# Patient Record
Sex: MALE | Race: WHITE | NOT HISPANIC OR LATINO | Employment: OTHER | URBAN - METROPOLITAN AREA
[De-identification: names, ages, dates, MRNs, and addresses within clinical notes are randomized per-mention and may not be internally consistent; named-entity substitution may affect disease eponyms.]

---

## 2018-08-10 ENCOUNTER — HISTORICAL (OUTPATIENT)
Dept: ADMINISTRATIVE | Facility: HOSPITAL | Age: 63
End: 2018-08-10

## 2018-08-10 LAB
ABS NEUT (OLG): 4.7
ALBUMIN SERPL-MCNC: 4.4 GM/DL (ref 3.4–5)
ALBUMIN/GLOB SERPL: 2 {RATIO} (ref 1.5–2.5)
ALP SERPL-CCNC: 35 UNIT/L (ref 38–126)
ALT SERPL-CCNC: 33 UNIT/L (ref 7–52)
APPEARANCE, UA: CLEAR
AST SERPL-CCNC: 24 UNIT/L (ref 15–37)
BACTERIA #/AREA URNS AUTO: ABNORMAL /HPF
BILIRUB SERPL-MCNC: 0.6 MG/DL (ref 0.2–1)
BILIRUB UR QL STRIP: NEGATIVE MG/DL
BILIRUBIN DIRECT+TOT PNL SERPL-MCNC: 0.1 MG/DL (ref 0–0.5)
BILIRUBIN DIRECT+TOT PNL SERPL-MCNC: 0.5 MG/DL
BUN SERPL-MCNC: 14 MG/DL (ref 7–18)
CALCIUM SERPL-MCNC: 9.2 MG/DL (ref 8.5–10)
CHLORIDE SERPL-SCNC: 106 MMOL/L (ref 98–107)
CHOLEST SERPL-MCNC: 176 MG/DL (ref 0–200)
CHOLEST/HDLC SERPL: 4 {RATIO}
CO2 SERPL-SCNC: 27 MMOL/L (ref 21–32)
COLOR UR: YELLOW
CREAT SERPL-MCNC: 0.9 MG/DL (ref 0.6–1.3)
ERYTHROCYTE [DISTWIDTH] IN BLOOD BY AUTOMATED COUNT: 13.1 % (ref 11.5–17)
EST. AVERAGE GLUCOSE BLD GHB EST-MCNC: 137 MG/DL
GLOBULIN SER-MCNC: 2.2 GM/DL (ref 1.2–3)
GLUCOSE (UA): NEGATIVE MG/DL
GLUCOSE SERPL-MCNC: 108 MG/DL (ref 74–106)
HBA1C MFR BLD: 6.4 % (ref 4.4–6.4)
HCT VFR BLD AUTO: 50.1 % (ref 42–52)
HDLC SERPL-MCNC: 44 MG/DL (ref 35–60)
HGB BLD-MCNC: 16.9 GM/DL (ref 14–18)
HGB UR QL STRIP: ABNORMAL UNIT/L
KETONES UR QL STRIP: NEGATIVE MG/DL
LDLC SERPL CALC-MCNC: 108 MG/DL (ref 0–129)
LEUKOCYTE ESTERASE UR QL STRIP: NEGATIVE UNIT/L
LYMPHOCYTES # BLD AUTO: 3 X10(3)/MCL (ref 0.6–3.4)
LYMPHOCYTES NFR BLD AUTO: 35 % (ref 13–40)
MCH RBC QN AUTO: 33.5 PG (ref 27–31.2)
MCHC RBC AUTO-ENTMCNC: 34 GM/DL (ref 32–36)
MCV RBC AUTO: 99 FL (ref 80–94)
MONOCYTES # BLD AUTO: 0.9 X10(3)/MCL (ref 0–1.8)
MONOCYTES NFR BLD AUTO: 10.3 % (ref 0.1–24)
NEUTROPHILS NFR BLD AUTO: 54.7 % (ref 47–80)
NITRITE UR QL STRIP.AUTO: NEGATIVE
PH UR STRIP: 6 [PH]
PLATELET # BLD AUTO: 301 X10(3)/MCL (ref 130–400)
PMV BLD AUTO: 10.2 FL
POTASSIUM SERPL-SCNC: 5.3 MMOL/L (ref 3.5–5.1)
PROT SERPL-MCNC: 6.6 GM/DL (ref 6.4–8.2)
PROT UR QL STRIP: NEGATIVE MG/DL
RBC # BLD AUTO: 5.04 X10(6)/MCL (ref 4.7–6.1)
RBC #/AREA URNS HPF: ABNORMAL /HPF
SODIUM SERPL-SCNC: 142 MMOL/L (ref 136–145)
SP GR UR STRIP: 1.02
SQUAMOUS EPITHELIAL, UA: ABNORMAL /LPF
TRIGL SERPL-MCNC: 124 MG/DL (ref 30–150)
TSH SERPL-ACNC: 1.75 MIU/ML (ref 0.35–4.94)
UROBILINOGEN UR STRIP-ACNC: 0.2 MG/DL
VLDLC SERPL CALC-MCNC: 24.8 MG/DL
WBC # SPEC AUTO: 8.6 X10(3)/MCL (ref 4.5–11.5)
WBC #/AREA URNS AUTO: ABNORMAL /[HPF]

## 2019-05-23 ENCOUNTER — HISTORICAL (OUTPATIENT)
Dept: ADMINISTRATIVE | Facility: HOSPITAL | Age: 64
End: 2019-05-23

## 2019-06-04 ENCOUNTER — HISTORICAL (OUTPATIENT)
Dept: ADMINISTRATIVE | Facility: HOSPITAL | Age: 64
End: 2019-06-04

## 2019-08-16 ENCOUNTER — HISTORICAL (OUTPATIENT)
Dept: ADMINISTRATIVE | Facility: HOSPITAL | Age: 64
End: 2019-08-16

## 2019-08-16 LAB
ABS NEUT (OLG): 4.3 X10(3)/MCL (ref 2.1–9.2)
ALBUMIN SERPL-MCNC: 4.3 GM/DL (ref 3.4–5)
ALBUMIN/GLOB SERPL: 1.87 {RATIO} (ref 1.5–2.5)
ALP SERPL-CCNC: 37 UNIT/L (ref 38–126)
ALT SERPL-CCNC: 33 UNIT/L (ref 7–52)
APPEARANCE, UA: CLEAR
AST SERPL-CCNC: 21 UNIT/L (ref 15–37)
BACTERIA #/AREA URNS AUTO: ABNORMAL /HPF
BILIRUB SERPL-MCNC: 0.5 MG/DL (ref 0.2–1)
BILIRUB UR QL STRIP: NEGATIVE MG/DL
BILIRUBIN DIRECT+TOT PNL SERPL-MCNC: 0.1 MG/DL (ref 0–0.5)
BILIRUBIN DIRECT+TOT PNL SERPL-MCNC: 0.4 MG/DL
BUN SERPL-MCNC: 16 MG/DL (ref 7–18)
CALCIUM SERPL-MCNC: 9.8 MG/DL (ref 8.5–10)
CHLORIDE SERPL-SCNC: 103 MMOL/L (ref 98–107)
CHOLEST SERPL-MCNC: 190 MG/DL (ref 0–200)
CHOLEST/HDLC SERPL: 3.4 {RATIO}
CO2 SERPL-SCNC: 33 MMOL/L (ref 21–32)
COLOR UR: YELLOW
CREAT SERPL-MCNC: 0.97 MG/DL (ref 0.6–1.3)
ERYTHROCYTE [DISTWIDTH] IN BLOOD BY AUTOMATED COUNT: 13.8 % (ref 11.5–17)
EST. AVERAGE GLUCOSE BLD GHB EST-MCNC: 128 MG/DL
GLOBULIN SER-MCNC: 2.3 GM/DL (ref 1.2–3)
GLUCOSE (UA): NEGATIVE MG/DL
GLUCOSE SERPL-MCNC: 119 MG/DL (ref 74–106)
HBA1C MFR BLD: 6.1 % (ref 4.4–6.4)
HCT VFR BLD AUTO: 50.7 % (ref 42–52)
HDLC SERPL-MCNC: 56 MG/DL (ref 35–60)
HGB BLD-MCNC: 17.3 GM/DL (ref 14–18)
HGB UR QL STRIP: ABNORMAL UNIT/L
KETONES UR QL STRIP: NEGATIVE MG/DL
LDLC SERPL CALC-MCNC: 110 MG/DL (ref 0–129)
LEUKOCYTE ESTERASE UR QL STRIP: NEGATIVE UNIT/L
LYMPHOCYTES # BLD AUTO: 2.4 X10(3)/MCL (ref 0.6–3.4)
LYMPHOCYTES NFR BLD AUTO: 30.4 % (ref 13–40)
MCH RBC QN AUTO: 33.3 PG (ref 27–31.2)
MCHC RBC AUTO-ENTMCNC: 34 GM/DL (ref 32–36)
MCV RBC AUTO: 98 FL (ref 80–94)
MONOCYTES # BLD AUTO: 1.1 X10(3)/MCL (ref 0.1–1.3)
MONOCYTES NFR BLD AUTO: 13.7 % (ref 0.1–24)
NEUTROPHILS NFR BLD AUTO: 55.9 % (ref 47–80)
NITRITE UR QL STRIP.AUTO: NEGATIVE
PH UR STRIP: 5 [PH]
PLATELET # BLD AUTO: 303 X10(3)/MCL (ref 130–400)
PMV BLD AUTO: 9.2 FL (ref 9.4–12.4)
POTASSIUM SERPL-SCNC: 5.2 MMOL/L (ref 3.5–5.1)
PROT SERPL-MCNC: 6.6 GM/DL (ref 6.4–8.2)
PROT UR QL STRIP: NEGATIVE MG/DL
PSA SERPL-MCNC: 0.48 NG/ML (ref 0–4.5)
RBC # BLD AUTO: 5.2 X10(6)/MCL (ref 4.7–6.1)
RBC #/AREA URNS HPF: ABNORMAL /HPF
SODIUM SERPL-SCNC: 140 MMOL/L (ref 136–145)
SP GR UR STRIP: 1.02
SQUAMOUS EPITHELIAL, UA: ABNORMAL /LPF
TRIGL SERPL-MCNC: 88 MG/DL (ref 30–150)
UROBILINOGEN UR STRIP-ACNC: 0.2 MG/DL
VLDLC SERPL CALC-MCNC: 17.6 MG/DL
WBC # SPEC AUTO: 7.8 X10(3)/MCL (ref 4.5–11.5)
WBC #/AREA URNS AUTO: ABNORMAL /[HPF]

## 2020-08-20 ENCOUNTER — HISTORICAL (OUTPATIENT)
Dept: ADMINISTRATIVE | Facility: HOSPITAL | Age: 65
End: 2020-08-20

## 2020-08-20 LAB
ABS NEUT (OLG): 4.9 X10(3)/MCL (ref 2.1–9.2)
ALBUMIN SERPL-MCNC: 4.5 GM/DL (ref 3.4–5)
ALBUMIN/GLOB SERPL: 2.05 {RATIO} (ref 1.5–2.5)
ALP SERPL-CCNC: 47 UNIT/L (ref 38–126)
ALT SERPL-CCNC: 27 UNIT/L (ref 7–52)
APPEARANCE, UA: CLEAR
AST SERPL-CCNC: 18 UNIT/L (ref 15–37)
BACTERIA #/AREA URNS AUTO: ABNORMAL /HPF
BILIRUB SERPL-MCNC: 0.6 MG/DL (ref 0.2–1)
BILIRUB UR QL STRIP: NEGATIVE MG/DL
BILIRUBIN DIRECT+TOT PNL SERPL-MCNC: 0.1 MG/DL (ref 0–0.5)
BILIRUBIN DIRECT+TOT PNL SERPL-MCNC: 0.5 MG/DL
BUN SERPL-MCNC: 14 MG/DL (ref 7–18)
CALCIUM SERPL-MCNC: 10.1 MG/DL (ref 8.5–10.1)
CHLORIDE SERPL-SCNC: 103 MMOL/L (ref 98–107)
CHOLEST SERPL-MCNC: 166 MG/DL (ref 0–200)
CHOLEST/HDLC SERPL: 3.7 {RATIO}
CO2 SERPL-SCNC: 29 MMOL/L (ref 21–32)
COLOR UR: YELLOW
CREAT SERPL-MCNC: 1.02 MG/DL (ref 0.6–1.3)
DEPRECATED CALCIDIOL+CALCIFEROL SERPL-MC: 23.3 NG/ML (ref 30–80)
ERYTHROCYTE [DISTWIDTH] IN BLOOD BY AUTOMATED COUNT: 13.6 % (ref 11.5–17)
EST. AVERAGE GLUCOSE BLD GHB EST-MCNC: 126 MG/DL
GLOBULIN SER-MCNC: 2.2 GM/DL (ref 1.2–3)
GLUCOSE (UA): NEGATIVE MG/DL
GLUCOSE SERPL-MCNC: 105 MG/DL (ref 74–106)
HBA1C MFR BLD: 6 % (ref 4.4–6.4)
HCT VFR BLD AUTO: 49.9 % (ref 42–52)
HDLC SERPL-MCNC: 45 MG/DL (ref 35–60)
HGB BLD-MCNC: 16.5 GM/DL (ref 14–18)
HGB UR QL STRIP: ABNORMAL UNIT/L
KETONES UR QL STRIP: ABNORMAL MG/DL
LDLC SERPL CALC-MCNC: 109 MG/DL (ref 0–129)
LEUKOCYTE ESTERASE UR QL STRIP: NEGATIVE UNIT/L
LYMPHOCYTES # BLD AUTO: 3.2 X10(3)/MCL (ref 0.6–3.4)
LYMPHOCYTES NFR BLD AUTO: 34.6 % (ref 13–40)
MCH RBC QN AUTO: 32.2 PG (ref 27–31.2)
MCHC RBC AUTO-ENTMCNC: 33 GM/DL (ref 32–36)
MCV RBC AUTO: 98 FL (ref 80–94)
MONOCYTES # BLD AUTO: 1.1 X10(3)/MCL (ref 0.1–1.3)
MONOCYTES NFR BLD AUTO: 11.6 % (ref 0.1–24)
NEUTROPHILS NFR BLD AUTO: 53.8 % (ref 47–80)
NITRITE UR QL STRIP.AUTO: NEGATIVE
PH UR STRIP: 5.5 [PH]
PLATELET # BLD AUTO: 324 X10(3)/MCL (ref 130–400)
PMV BLD AUTO: 9.8 FL (ref 9.4–12.4)
POTASSIUM SERPL-SCNC: 4.8 MMOL/L (ref 3.5–5.1)
PROT SERPL-MCNC: 6.7 GM/DL (ref 6.4–8.2)
PROT UR QL STRIP: NEGATIVE MG/DL
PSA SERPL-MCNC: 0.5 NG/ML (ref 0–4.5)
RBC # BLD AUTO: 5.12 X10(6)/MCL (ref 4.7–6.1)
RBC #/AREA URNS HPF: ABNORMAL /HPF
SODIUM SERPL-SCNC: 140 MMOL/L (ref 136–145)
SP GR UR STRIP: 1.02
SQUAMOUS EPITHELIAL, UA: ABNORMAL /LPF
TRIGL SERPL-MCNC: 185 MG/DL (ref 30–150)
UROBILINOGEN UR STRIP-ACNC: 0.2 MG/DL
VLDLC SERPL CALC-MCNC: 37 MG/DL
WBC # SPEC AUTO: 9.2 X10(3)/MCL (ref 4.5–11.5)
WBC #/AREA URNS AUTO: ABNORMAL /[HPF]

## 2021-02-25 ENCOUNTER — HISTORICAL (OUTPATIENT)
Dept: ADMINISTRATIVE | Facility: HOSPITAL | Age: 66
End: 2021-02-25

## 2021-02-25 LAB
DEPRECATED CALCIDIOL+CALCIFEROL SERPL-MC: 39.4 NG/ML (ref 30–80)
EST. AVERAGE GLUCOSE BLD GHB EST-MCNC: 134 MG/DL
HBA1C MFR BLD: 6.3 % (ref 4.4–6.4)

## 2021-08-25 ENCOUNTER — HISTORICAL (OUTPATIENT)
Dept: ADMINISTRATIVE | Facility: HOSPITAL | Age: 66
End: 2021-08-25

## 2021-08-25 LAB
ABS NEUT (OLG): 4.2 X10(3)/MCL (ref 2.1–9.2)
ALBUMIN SERPL-MCNC: 4.4 GM/DL (ref 3.4–5)
ALBUMIN/GLOB SERPL: 1.83 {RATIO} (ref 1.5–2.5)
ALP SERPL-CCNC: 45 UNIT/L (ref 38–126)
ALT SERPL-CCNC: 47 UNIT/L (ref 7–52)
APPEARANCE, UA: ABNORMAL
AST SERPL-CCNC: 31 UNIT/L (ref 15–37)
BACTERIA #/AREA URNS AUTO: ABNORMAL /HPF
BILIRUB SERPL-MCNC: 0.6 MG/DL (ref 0.2–1)
BILIRUB UR QL STRIP: NEGATIVE MG/DL
BILIRUBIN DIRECT+TOT PNL SERPL-MCNC: 0.2 MG/DL (ref 0–0.5)
BILIRUBIN DIRECT+TOT PNL SERPL-MCNC: 0.4 MG/DL
BUN SERPL-MCNC: 14 MG/DL (ref 7–18)
CALCIUM SERPL-MCNC: 10.2 MG/DL (ref 8.5–10.1)
CHLORIDE SERPL-SCNC: 101 MMOL/L (ref 98–107)
CHOLEST SERPL-MCNC: 188 MG/DL (ref 0–200)
CHOLEST/HDLC SERPL: 4.1 {RATIO}
CO2 SERPL-SCNC: 30 MMOL/L (ref 21–32)
COLOR UR: ABNORMAL
CREAT SERPL-MCNC: 1.06 MG/DL (ref 0.6–1.3)
DEPRECATED CALCIDIOL+CALCIFEROL SERPL-MC: 36.7 NG/ML (ref 30–80)
ERYTHROCYTE [DISTWIDTH] IN BLOOD BY AUTOMATED COUNT: 13.2 % (ref 11.5–17)
EST. AVERAGE GLUCOSE BLD GHB EST-MCNC: 134 MG/DL
GLOBULIN SER-MCNC: 2.4 GM/DL (ref 1.2–3)
GLUCOSE (UA): NEGATIVE MG/DL
GLUCOSE SERPL-MCNC: 137 MG/DL (ref 74–106)
HBA1C MFR BLD: 6.3 % (ref 4.4–6.4)
HCT VFR BLD AUTO: 50.2 % (ref 42–52)
HDLC SERPL-MCNC: 46 MG/DL (ref 35–60)
HGB BLD-MCNC: 16.8 GM/DL (ref 14–18)
HGB UR QL STRIP: ABNORMAL UNIT/L
KETONES UR QL STRIP: NEGATIVE MG/DL
LDLC SERPL CALC-MCNC: 117 MG/DL (ref 0–129)
LEUKOCYTE ESTERASE UR QL STRIP: ABNORMAL UNIT/L
LYMPHOCYTES # BLD AUTO: 2.8 X10(3)/MCL (ref 0.6–3.4)
LYMPHOCYTES NFR BLD AUTO: 35.4 % (ref 13–40)
MCH RBC QN AUTO: 32.3 PG (ref 27–31.2)
MCHC RBC AUTO-ENTMCNC: 34 GM/DL (ref 32–36)
MCV RBC AUTO: 96 FL (ref 80–94)
MONOCYTES # BLD AUTO: 0.8 X10(3)/MCL (ref 0.1–1.3)
MONOCYTES NFR BLD AUTO: 10.6 % (ref 0.1–24)
NEUTROPHILS NFR BLD AUTO: 54 % (ref 47–80)
NITRITE UR QL STRIP.AUTO: NEGATIVE
PH UR STRIP: 6 [PH]
PLATELET # BLD AUTO: 326 X10(3)/MCL (ref 130–400)
PMV BLD AUTO: 9.7 FL (ref 9.4–12.4)
POTASSIUM SERPL-SCNC: 5.1 MMOL/L (ref 3.5–5.1)
PROT SERPL-MCNC: 6.8 GM/DL (ref 6.4–8.2)
PROT UR QL STRIP: NEGATIVE MG/DL
PSA SERPL-MCNC: 0.49 NG/ML (ref 0–4.5)
RBC # BLD AUTO: 5.2 X10(6)/MCL (ref 4.7–6.1)
RBC #/AREA URNS HPF: ABNORMAL /HPF
SODIUM SERPL-SCNC: 140 MMOL/L (ref 136–145)
SP GR UR STRIP: 1.02
SQUAMOUS EPITHELIAL, UA: ABNORMAL /LPF
TRIGL SERPL-MCNC: 164 MG/DL (ref 30–150)
TSH SERPL-ACNC: 2.31 MIU/ML (ref 0.35–4.94)
UROBILINOGEN UR STRIP-ACNC: 1 MG/DL
VLDLC SERPL CALC-MCNC: 32.8 MG/DL
WBC # SPEC AUTO: 7.8 X10(3)/MCL (ref 4.5–11.5)
WBC #/AREA URNS AUTO: ABNORMAL /[HPF]

## 2021-09-15 ENCOUNTER — HISTORICAL (OUTPATIENT)
Dept: ADMINISTRATIVE | Facility: HOSPITAL | Age: 66
End: 2021-09-15

## 2021-09-15 LAB
APPEARANCE, UA: CLEAR
BACTERIA #/AREA URNS AUTO: ABNORMAL /HPF
BILIRUB UR QL STRIP: NEGATIVE MG/DL
COLOR UR: YELLOW
GLUCOSE (UA): NEGATIVE MG/DL
HGB UR QL STRIP: ABNORMAL UNIT/L
KETONES UR QL STRIP: NEGATIVE MG/DL
LEUKOCYTE ESTERASE UR QL STRIP: NEGATIVE UNIT/L
NITRITE UR QL STRIP.AUTO: NEGATIVE
PH UR STRIP: 6 [PH]
PROT UR QL STRIP: NEGATIVE MG/DL
RBC #/AREA URNS HPF: ABNORMAL /HPF
SP GR UR STRIP: >1.03
SQUAMOUS EPITHELIAL, UA: ABNORMAL /LPF
UROBILINOGEN UR STRIP-ACNC: 0.2 MG/DL
WBC #/AREA URNS AUTO: ABNORMAL /[HPF]

## 2022-04-11 ENCOUNTER — HISTORICAL (OUTPATIENT)
Dept: ADMINISTRATIVE | Facility: HOSPITAL | Age: 67
End: 2022-04-11

## 2022-04-29 VITALS
WEIGHT: 243.38 LBS | DIASTOLIC BLOOD PRESSURE: 70 MMHG | SYSTOLIC BLOOD PRESSURE: 130 MMHG | BODY MASS INDEX: 34.84 KG/M2 | HEIGHT: 70 IN | OXYGEN SATURATION: 95 %

## 2022-08-25 PROBLEM — R73.9 HYPERGLYCEMIA: Status: ACTIVE | Noted: 2022-08-25

## 2022-08-25 PROBLEM — I10 ESSENTIAL (PRIMARY) HYPERTENSION: Status: ACTIVE | Noted: 2022-08-25

## 2022-08-25 PROBLEM — E55.9 VITAMIN D DEFICIENCY: Status: ACTIVE | Noted: 2022-08-25

## 2022-08-25 PROBLEM — Z00.00 WELLNESS EXAMINATION: Status: ACTIVE | Noted: 2022-08-25

## 2022-08-25 PROBLEM — G47.33 OBSTRUCTIVE SLEEP APNEA ON CPAP: Status: ACTIVE | Noted: 2022-08-25

## 2022-08-25 PROBLEM — G62.9 NEUROPATHY: Status: ACTIVE | Noted: 2022-08-25

## 2022-08-25 PROBLEM — M54.50 LOW BACK PAIN: Status: ACTIVE | Noted: 2022-08-25

## 2022-08-25 PROBLEM — E78.00 HYPERCHOLESTEREMIA: Status: ACTIVE | Noted: 2022-08-25

## 2022-09-27 PROBLEM — E11.9 TYPE 2 DIABETES MELLITUS WITHOUT COMPLICATION, WITHOUT LONG-TERM CURRENT USE OF INSULIN: Status: ACTIVE | Noted: 2022-09-27

## 2022-10-18 PROBLEM — R10.9 FLANK PAIN: Status: ACTIVE | Noted: 2022-10-18

## 2022-10-18 PROCEDURE — 87088 URINE BACTERIA CULTURE: CPT | Performed by: FAMILY MEDICINE

## 2022-11-28 PROBLEM — Z00.00 WELLNESS EXAMINATION: Status: RESOLVED | Noted: 2022-08-25 | Resolved: 2022-11-28

## 2022-11-28 PROBLEM — Z23 IMMUNIZATION DUE: Status: ACTIVE | Noted: 2022-11-28

## 2023-01-17 ENCOUNTER — OFFICE VISIT (OUTPATIENT)
Dept: NEUROLOGY | Facility: CLINIC | Age: 68
End: 2023-01-17
Payer: MEDICARE

## 2023-01-17 VITALS
HEIGHT: 69 IN | WEIGHT: 213 LBS | SYSTOLIC BLOOD PRESSURE: 160 MMHG | DIASTOLIC BLOOD PRESSURE: 92 MMHG | BODY MASS INDEX: 31.55 KG/M2

## 2023-01-17 DIAGNOSIS — G47.33 OBSTRUCTIVE SLEEP APNEA SYNDROME: Primary | ICD-10-CM

## 2023-01-17 PROCEDURE — 99213 OFFICE O/P EST LOW 20 MIN: CPT | Mod: PBBFAC | Performed by: SPECIALIST

## 2023-01-17 PROCEDURE — 99203 OFFICE O/P NEW LOW 30 MIN: CPT | Mod: S$PBB,,, | Performed by: SPECIALIST

## 2023-01-17 PROCEDURE — 99203 PR OFFICE/OUTPT VISIT, NEW, LEVL III, 30-44 MIN: ICD-10-PCS | Mod: S$PBB,,, | Performed by: SPECIALIST

## 2023-01-17 PROCEDURE — 99999 PR PBB SHADOW E&M-EST. PATIENT-LVL III: CPT | Mod: PBBFAC,,, | Performed by: SPECIALIST

## 2023-01-17 PROCEDURE — 99999 PR PBB SHADOW E&M-EST. PATIENT-LVL III: ICD-10-PCS | Mod: PBBFAC,,, | Performed by: SPECIALIST

## 2023-01-17 RX ORDER — MELOXICAM 7.5 MG/1
7.5 TABLET ORAL
COMMUNITY
Start: 2023-01-02 | End: 2023-09-08 | Stop reason: SDUPTHER

## 2023-01-17 NOTE — PROGRESS NOTES
Subjective:       Patient ID: Micah Mendoza is a 67 y.o. male.    Chief Complaint: snoring; sleep apnea evaluation; other____    HPI:            Previos pt Last OV 2016-GUY.    (SS sone her in 2016 Dx w GUY.   Has not been on CPAP for 1 yr due to recall.   Falls asleep in 15-30 min.   Sleeps 5-9 and awakens 1-3 due to nocturia and is able to go back to sleep in abt 10 min.   Awakens unrefreshed and has EDS.   Does not nap.   TV in room is off. )    notes may also be on facesheet for HPI, ROS, and other sections     Review of Systems  Hard copy ROS reviewed     EPWORTH SLEEPINESS SCALE 1/17/2023   Sitting and reading 2   Watching TV 1   Sitting, inactive in a public place (e.g. a theatre or a meeting) 0   As a passenger in a car for an hour without a break 0   Lying down to rest in the afternoon when circumstances permit 1   Sitting and talking to someone 0   Sitting quietly after a lunch without alcohol 0   In a car, while stopped for a few minutes in traffic 0   Total score 4           Social History     Socioeconomic History    Marital status:     Number of children: 1   Occupational History    Occupation: yard work   Tobacco Use    Smoking status: Never    Smokeless tobacco: Never   Substance and Sexual Activity    Alcohol use: Not Currently    Drug use: Not Currently     Types: Marijuana     ----------------------------  Environmental allergies  Essential (primary) hypertension  Fatigue  Hypercholesteremia  Low back pain  Neck pain  Neuropathy  Obstructive sleep apnea on CPAP  Unspecified osteoarthritis, unspecified site  Vitamin D deficiency      Current Outpatient Medications:     amLODIPine (NORVASC) 10 MG tablet, Take 1 tablet (10 mg total) by mouth once daily., Disp: 90 tablet, Rfl: 3    blood sugar diagnostic Strp, To check BG 2  times daily, to use with insurance preferred meter, Disp: 100 each, Rfl: 11    blood-glucose meter kit, To check BG 2  times daily, to use with insurance preferred meter,  "Disp: 1 each, Rfl: 0    cholecalciferol, vitamin D3, (VITAMIN D3) 25 mcg (1,000 unit) capsule, Take 25 mcg by mouth once daily., Disp: , Rfl:     citalopram (CELEXA) 40 MG tablet, Take 1 tablet (40 mg total) by mouth once daily., Disp: 90 tablet, Rfl: 3    lancets Misc, To check BG 2  times daily, to use with insurance preferred meter, Disp: 100 each, Rfl: 11    meloxicam (MOBIC) 7.5 MG tablet, Take 7.5 mg by mouth., Disp: , Rfl:     metFORMIN (GLUCOPHAGE) 1000 MG tablet, Take 1 tablet (1,000 mg total) by mouth 2 (two) times daily with meals., Disp: 180 tablet, Rfl: 1    nitroGLYCERIN (NITROSTAT) 0.4 MG SL tablet, Place 0.4 mg under the tongue every 5 (five) minutes as needed for Chest pain., Disp: , Rfl:     ONETOUCH DELICA PLUS LANCET 33 gauge Misc, USE TO TEST BLOOD SUGAR 4 TIMES DAILY, Disp: , Rfl:     ONETOUCH ULTRA2 METER Misc, USE TO TEST BLOOD SUGAR 4 TIMES DAILY, Disp: , Rfl:     rosuvastatin (CRESTOR) 20 MG tablet, Take 1 tablet (20 mg total) by mouth every evening., Disp: 90 tablet, Rfl: 3     Objective:      Exam:   BP (!) 160/92   Ht 5' 9" (1.753 m)   Wt 96.6 kg (213 lb)   BMI 31.45 kg/m²   Neck Circumference: 16.5 in   General Exam  if accompanied, by__  mental status_alert and appropriate  Oropharynx Mallampati grade_ 3  body habitus_ Body mass index is 31.45 kg/m².   Heart_ RRR  Extremities_ no edema   skin_  Neurological  Speech __ normal   cranial nerves:  CN 2 VF_  CN 7_no lower face asymmetry  CN 12 tongue_ok  Motor__ PATTEN's   Gait: unassisted    Labs:  __  Lengthy discussion about the risks of untreated moderate to severe obstructive sleep apnea (GUY).   Testing modalities/test options for sleep apnea discussed.  Potential need for treatment of GUY discussed including CPAP or Bilevel  PAP.   Alternatives to PAP discussed if PAP not ultimately tolerated.  Risks of drowsy driving discussed.  Weight loss discussed if patient is overweight.          Assessment/Plan:       Problem List Items " Addressed This Visit          Other    Obstructive sleep apnea syndrome - Primary               Orders Placed This Encounter   Procedures    Home Sleep Study       _._hst ordered, and if PAP needed pt unwilling to come into the lab for PAP night autopap can be ordered       Tyshawn Vazquez MD

## 2023-01-19 ENCOUNTER — PROCEDURE VISIT (OUTPATIENT)
Dept: SLEEP MEDICINE | Facility: HOSPITAL | Age: 68
End: 2023-01-19
Attending: SPECIALIST
Payer: MEDICARE

## 2023-01-19 DIAGNOSIS — G47.33 OBSTRUCTIVE SLEEP APNEA SYNDROME: Primary | ICD-10-CM

## 2023-01-19 PROCEDURE — 95806 SLEEP STUDY UNATT&RESP EFFT: CPT | Mod: 26,,, | Performed by: SPECIALIST

## 2023-01-19 PROCEDURE — 95806 SLEEP STUDY UNATT&RESP EFFT: CPT

## 2023-01-19 PROCEDURE — 95806 PR SLEEP STUDY, UNATTENDED, SIMUL RECORD HR/O2 SAT/RESP FLOW/RESP EFFT: ICD-10-PCS | Mod: 26,,, | Performed by: SPECIALIST

## 2023-02-14 ENCOUNTER — OFFICE VISIT (OUTPATIENT)
Dept: NEUROLOGY | Facility: CLINIC | Age: 68
End: 2023-02-14
Payer: MEDICARE

## 2023-02-14 VITALS
HEIGHT: 69 IN | WEIGHT: 200 LBS | SYSTOLIC BLOOD PRESSURE: 116 MMHG | DIASTOLIC BLOOD PRESSURE: 72 MMHG | BODY MASS INDEX: 29.62 KG/M2

## 2023-02-14 DIAGNOSIS — G47.33 OBSTRUCTIVE SLEEP APNEA: Primary | ICD-10-CM

## 2023-02-14 PROCEDURE — 99213 OFFICE O/P EST LOW 20 MIN: CPT | Mod: S$PBB,,, | Performed by: NURSE PRACTITIONER

## 2023-02-14 PROCEDURE — 99999 PR PBB SHADOW E&M-EST. PATIENT-LVL III: ICD-10-PCS | Mod: PBBFAC,,, | Performed by: NURSE PRACTITIONER

## 2023-02-14 PROCEDURE — 99999 PR PBB SHADOW E&M-EST. PATIENT-LVL III: CPT | Mod: PBBFAC,,, | Performed by: NURSE PRACTITIONER

## 2023-02-14 PROCEDURE — 99213 OFFICE O/P EST LOW 20 MIN: CPT | Mod: PBBFAC | Performed by: NURSE PRACTITIONER

## 2023-02-14 PROCEDURE — 99213 PR OFFICE/OUTPT VISIT, EST, LEVL III, 20-29 MIN: ICD-10-PCS | Mod: S$PBB,,, | Performed by: NURSE PRACTITIONER

## 2023-02-14 NOTE — PROGRESS NOTES
Established GUY Patient   SUBJECTIVE:    Patient ID: Micah Mendoza , 67 y.o.    Past Medical History:   Diagnosis Date    Environmental allergies     Essential (primary) hypertension     Fatigue     Hypercholesteremia     Low back pain     Neck pain     Neuropathy     Obstructive sleep apnea on CPAP     Unspecified osteoarthritis, unspecified site     Vitamin D deficiency        Past Surgical History:   Procedure Laterality Date    ANTERIOR CRUCIATE LIGAMENT REPAIR      Cervical vertebral fusion      CLOSED REDUCTION OF FRACTURE OF NASAL BONE      Closed [percutaneous] [needle] biopsy of kidney  02/15/2012    COLONOSCOPY  10/17/2011    Excision of accessory spleen  02/15/2012    EXPLORATORY LAPAROTOMY      LAPAROSCOPY  02/15/2012    NASAL SEPTOPLASTY      orchidectomy      Right foot      ROTATOR CUFF REPAIR      Unilateral adrenalectomy   02/15/2012       Family History   Problem Relation Age of Onset    Dementia Mother     Diabetes Mother     Blindness Father     Deafness Father     Stroke Father     Alcohol abuse Brother        Social History     Socioeconomic History    Marital status:     Number of children: 1   Occupational History    Occupation: yard work   Tobacco Use    Smoking status: Never    Smokeless tobacco: Never   Substance and Sexual Activity    Alcohol use: Not Currently    Drug use: Not Currently     Types: Marijuana       Review of patient's allergies indicates:   Allergen Reactions    Penicillin Swelling       Chief Complaint:  HST results    History of Present Illness:     Review HST results; no changes since last visit.     Review of Systems - as per HPI, otherwise a balanced 10 systems review is negative.      Current Medications:    Current Outpatient Medications:     amLODIPine (NORVASC) 10 MG tablet, Take 1 tablet (10 mg total) by mouth once daily., Disp: 90 tablet, Rfl: 3    blood sugar diagnostic Strp, To check BG 2  times daily, to use with insurance preferred meter, Disp:  "100 each, Rfl: 11    blood-glucose meter kit, To check BG 2  times daily, to use with insurance preferred meter, Disp: 1 each, Rfl: 0    cholecalciferol, vitamin D3, (VITAMIN D3) 25 mcg (1,000 unit) capsule, Take 25 mcg by mouth once daily., Disp: , Rfl:     citalopram (CELEXA) 40 MG tablet, Take 1 tablet (40 mg total) by mouth once daily., Disp: 90 tablet, Rfl: 3    lancets Misc, To check BG 2  times daily, to use with insurance preferred meter, Disp: 100 each, Rfl: 11    meloxicam (MOBIC) 7.5 MG tablet, Take 7.5 mg by mouth., Disp: , Rfl:     metFORMIN (GLUCOPHAGE) 1000 MG tablet, Take 1 tablet (1,000 mg total) by mouth 2 (two) times daily with meals., Disp: 180 tablet, Rfl: 1    nitroGLYCERIN (NITROSTAT) 0.4 MG SL tablet, Place 0.4 mg under the tongue every 5 (five) minutes as needed for Chest pain., Disp: , Rfl:     ONETOUCH DELICA PLUS LANCET 33 gauge Misc, USE TO TEST BLOOD SUGAR 4 TIMES DAILY, Disp: , Rfl:     ONETOUCH ULTRA2 METER Misc, USE TO TEST BLOOD SUGAR 4 TIMES DAILY, Disp: , Rfl:     rosuvastatin (CRESTOR) 20 MG tablet, Take 1 tablet (20 mg total) by mouth every evening., Disp: 90 tablet, Rfl: 3      OBJECTIVE:    Vitals:  /72 (BP Location: Left arm, Patient Position: Sitting)   Ht 5' 9" (1.753 m)   Wt 90.7 kg (200 lb)   BMI 29.53 kg/m²      Physical Exam:  Constitutional  he appears well-developed and well-nourished. he is well groomed.    Accompanied by - self  Appearance - well appearing, no apparent distress, unassisted  Heart - RRR auscultated without murmur  Lungs - CTA   Skin- no obvious lesions noted    Neurologic  Cortical function - The patient is alert, attentive, and oriented  Speech - clear   Cranial nerves:  CN 3, 4, 6 EOMs - normal. No ptosis or lateral gaze deviation  CN 7 - no face asymmetry; normal eye closure and smile  CN 8 - hearing is grossly normal  Motor - grossly normal  Gait - unassisted; posture upright. gait is steady with normal steps    Review of Data:    "       EPWORTH SLEEPINESS SCALE 1/17/2023   Sitting and reading 2   Watching TV 1   Sitting, inactive in a public place (e.g. a theatre or a meeting) 0   As a passenger in a car for an hour without a break 0   Lying down to rest in the afternoon when circumstances permit 1   Sitting and talking to someone 0   Sitting quietly after a lunch without alcohol 0   In a car, while stopped for a few minutes in traffic 0   Total score 4       Labs:  Office Visit on 11/28/2022   Component Date Value Ref Range Status    Urine Creatinine 11/28/2022 300.0  mg/dL Final    Microalbumin Creatinine Ratio 11/28/2022 10.0  mg/gm Cr Final    Urine Microalbumin 11/28/2022 30.0  mg/L Final    Hemoglobin A1c 11/28/2022 6.1  % Final    Estimated Average Glucose 11/28/2022 128.4  mg/dL Final          ASSESSMENT /PLAN:    Problem List Items Addressed This Visit          Other    Obstructive sleep apnea - Primary    HST results:   HST DELFINA: 26   Minimum SaO2: 81 %     Reviewed HST report with pt (see detailed results and interpretation scanned into chart). Suggest autoPAP with pressures from 5-20 cm.    Encouraged nightly use of PAP. Discussed minimum insurance guidelines for PAP use    Reminded pt that drowsy driving may still occur despite PAP use.    Follow up requested in 3 mo. Instructed pt to call prior if needed            Questions and concerns were sought and answered to the patient's stated verbal satisfaction.    The patient verbalizes understanding and agreement with the above stated treatment plan.   Dr. Vazquez was available during today's encounter.     Items discussed include acute and/or chronic neurological, sleep, or other issues and their attendant differential diagnoses.  Potential for additional testing, treatment options, and prognosis also discussed.    __*_single dx ___multiple issues/ diagnoses  ___ low __* mod ___ high complexity of data  __*_low __mod ___ high risks     Medical Decision Making (MDM) used for CPT  choice:  _*__low  ___moderate  ____high        FERNANDA Roca  [facility]  [phone]

## 2023-02-25 PROBLEM — M54.42 CHRONIC MIDLINE LOW BACK PAIN WITH LEFT-SIDED SCIATICA: Status: ACTIVE | Noted: 2022-08-25

## 2023-02-25 PROBLEM — G89.29 CHRONIC MIDLINE LOW BACK PAIN WITH LEFT-SIDED SCIATICA: Status: ACTIVE | Noted: 2022-08-25

## 2023-02-25 PROBLEM — I65.23 BILATERAL CAROTID ARTERY STENOSIS: Status: ACTIVE | Noted: 2023-02-25

## 2023-04-24 ENCOUNTER — OFFICE VISIT (OUTPATIENT)
Dept: NEUROLOGY | Facility: CLINIC | Age: 68
End: 2023-04-24
Payer: MEDICARE

## 2023-04-24 VITALS
DIASTOLIC BLOOD PRESSURE: 60 MMHG | SYSTOLIC BLOOD PRESSURE: 110 MMHG | WEIGHT: 200 LBS | HEIGHT: 69 IN | BODY MASS INDEX: 29.62 KG/M2

## 2023-04-24 DIAGNOSIS — G47.33 OBSTRUCTIVE SLEEP APNEA: Primary | ICD-10-CM

## 2023-04-24 PROCEDURE — 99213 OFFICE O/P EST LOW 20 MIN: CPT | Mod: PBBFAC | Performed by: NURSE PRACTITIONER

## 2023-04-24 PROCEDURE — 99213 OFFICE O/P EST LOW 20 MIN: CPT | Mod: S$PBB,,, | Performed by: NURSE PRACTITIONER

## 2023-04-24 PROCEDURE — 99999 PR PBB SHADOW E&M-EST. PATIENT-LVL III: ICD-10-PCS | Mod: PBBFAC,,, | Performed by: NURSE PRACTITIONER

## 2023-04-24 PROCEDURE — 99999 PR PBB SHADOW E&M-EST. PATIENT-LVL III: CPT | Mod: PBBFAC,,, | Performed by: NURSE PRACTITIONER

## 2023-04-24 PROCEDURE — 99213 PR OFFICE/OUTPT VISIT, EST, LEVL III, 20-29 MIN: ICD-10-PCS | Mod: S$PBB,,, | Performed by: NURSE PRACTITIONER

## 2023-04-24 RX ORDER — PYRIDOXINE HCL (VITAMIN B6) 100 MG
50 TABLET ORAL DAILY
COMMUNITY

## 2023-04-24 NOTE — PROGRESS NOTES
Usage dates: 03/17/23-04/19/23  Usage days >= 4 hours: 100  Current pressure: apap 5/96wqG1K  AHI: 3.4

## 2023-04-24 NOTE — PROGRESS NOTES
Established GUY Patient   SUBJECTIVE:    Patient ID: Micah Mendoza , 67 y.o.    Past Medical History:   Diagnosis Date    Environmental allergies     Essential (primary) hypertension     Fatigue     Hypercholesteremia     Neck pain     Neuropathy     Obstructive sleep apnea on CPAP     Unspecified osteoarthritis, unspecified site     Vitamin D deficiency        Past Surgical History:   Procedure Laterality Date    ANTERIOR CRUCIATE LIGAMENT REPAIR      Cervical vertebral fusion      CLOSED REDUCTION OF FRACTURE OF NASAL BONE      Closed [percutaneous] [needle] biopsy of kidney  02/15/2012    COLONOSCOPY  10/17/2011    Excision of accessory spleen  02/15/2012    EXPLORATORY LAPAROTOMY      LAPAROSCOPY  02/15/2012    NASAL SEPTOPLASTY      orchidectomy      Right foot      ROTATOR CUFF REPAIR      Unilateral adrenalectomy   02/15/2012       Family History   Problem Relation Age of Onset    Dementia Mother     Diabetes Mother     Blindness Father     Deafness Father     Stroke Father     Alcohol abuse Brother        Social History     Socioeconomic History    Marital status:     Number of children: 1   Occupational History    Occupation: yard work   Tobacco Use    Smoking status: Never    Smokeless tobacco: Never   Substance and Sexual Activity    Alcohol use: Not Currently    Drug use: Not Currently     Types: Marijuana       Review of patient's allergies indicates:   Allergen Reactions    Penicillin Swelling       Chief Complaint: GUY f/u    History of Present Illness:     Here for PAP follow up. Sleeping well at night with PAP. Feels better the following day after PAP use; denies EDS.   Denies problems with PAP machine. Able to get supplies    Review of Systems - as per HPI, otherwise a balanced 10 systems review is negative.      Current Medications:    Current Outpatient Medications:     amLODIPine (NORVASC) 10 MG tablet, Take 1 tablet (10 mg total) by mouth once daily., Disp: 90 tablet, Rfl: 3     "blood sugar diagnostic Strp, To check BG 2  times daily, to use with insurance preferred meter, Disp: 100 each, Rfl: 11    blood-glucose meter kit, To check BG 2  times daily, to use with insurance preferred meter, Disp: 1 each, Rfl: 0    cholecalciferol, vitamin D3, (VITAMIN D3) 25 mcg (1,000 unit) capsule, Take 25 mcg by mouth once daily., Disp: , Rfl:     citalopram (CELEXA) 40 MG tablet, Take 1 tablet (40 mg total) by mouth once daily., Disp: 90 tablet, Rfl: 3    lancets Misc, To check BG 2  times daily, to use with insurance preferred meter, Disp: 100 each, Rfl: 11    meloxicam (MOBIC) 7.5 MG tablet, Take 7.5 mg by mouth., Disp: , Rfl:     metFORMIN (GLUCOPHAGE) 1000 MG tablet, Take 1 tablet (1,000 mg total) by mouth 2 (two) times daily with meals., Disp: 180 tablet, Rfl: 1    nitroGLYCERIN (NITROSTAT) 0.4 MG SL tablet, Place 0.4 mg under the tongue every 5 (five) minutes as needed for Chest pain., Disp: , Rfl:     ONETOUCH DELICA PLUS LANCET 33 gauge Misc, USE TO TEST BLOOD SUGAR 4 TIMES DAILY, Disp: , Rfl:     ONETOUCH ULTRA2 METER Misc, USE TO TEST BLOOD SUGAR 4 TIMES DAILY, Disp: , Rfl:     pyridoxine, vitamin B6, (VITAMIN B-6) 100 MG Tab, Take 50 mg by mouth once daily., Disp: , Rfl:     rosuvastatin (CRESTOR) 20 MG tablet, Take 1 tablet (20 mg total) by mouth every evening., Disp: 90 tablet, Rfl: 3      OBJECTIVE:    Vitals:  /60   Ht 5' 9" (1.753 m)   Wt 90.7 kg (200 lb)   BMI 29.53 kg/m²      Physical Exam:  Constitutional  he appears well-developed and well-nourished. he is well groomed.    Accompanied by - self  Appearance - well appearing, no apparent distress, unassisted  Skin- no obvious lesions noted    Neurologic  Cortical function - The patient is alert, attentive, and oriented  Speech - clear   Cranial nerves:  CN 3, 4, 6 EOMs - normal. No ptosis or lateral gaze deviation  CN 7 - no face asymmetry; normal eye closure and smile  CN 8 - hearing is grossly normal  Motor - grossly " normal  Gait - unassisted; posture upright. gait is steady with normal steps    Review of Data:      PAP Compliance Report  Last 30 days  Usage-100  Usage > 4 hrs -100  AHI -3.4    EPWORTH SLEEPINESS SCALE 4/24/2023   Sitting and reading 3   Watching TV 1   Sitting, inactive in a public place (e.g. a theatre or a meeting) 0   As a passenger in a car for an hour without a break 0   Lying down to rest in the afternoon when circumstances permit 2   Sitting and talking to someone 0   Sitting quietly after a lunch without alcohol 0   In a car, while stopped for a few minutes in traffic 0   Total score 6              ASSESSMENT /PLAN:    Problem List Items Addressed This Visit          Other    Obstructive sleep apnea - Primary    Overview     - Continues to benefit from PAP therapy. Encouraged nightly use of PAP.   - Drowsy driving may still occur despite PAP use.   - F/u in 1 yr                         Questions and concerns were sought and answered to the patient's stated verbal satisfaction.    The patient verbalizes understanding and agreement with the above stated treatment plan.   Dr. Vazquez was available during today's encounter.     Items discussed include acute and/or chronic neurological, sleep, or other issues and their attendant differential diagnoses.  Potential for additional testing, treatment options, and prognosis also discussed.    __*_single dx ___multiple issues/ diagnoses  ___ low __* mod ___ high complexity of data  __*_low __mod ___ high risks     Medical Decision Making (MDM) used for CPT choice:  _*__low  ___moderate  ____high        FERNANDA Roca  [facility]  [phone]

## 2023-09-06 PROBLEM — Z00.00 MEDICARE ANNUAL WELLNESS VISIT, SUBSEQUENT: Status: ACTIVE | Noted: 2023-09-06

## 2023-09-08 PROBLEM — Z12.5 SCREENING FOR PROSTATE CANCER: Status: ACTIVE | Noted: 2023-09-08

## 2023-09-08 PROBLEM — I70.0 ATHEROSCLEROSIS OF AORTA: Status: ACTIVE | Noted: 2023-09-08

## 2023-12-04 ENCOUNTER — HOSPITAL ENCOUNTER (EMERGENCY)
Facility: HOSPITAL | Age: 68
Discharge: HOME | End: 2023-12-04
Attending: EMERGENCY MEDICINE
Payer: MEDICARE

## 2023-12-04 VITALS
SYSTOLIC BLOOD PRESSURE: 121 MMHG | HEART RATE: 81 BPM | HEIGHT: 71 IN | DIASTOLIC BLOOD PRESSURE: 74 MMHG | RESPIRATION RATE: 18 BRPM | OXYGEN SATURATION: 100 % | BODY MASS INDEX: 28 KG/M2 | WEIGHT: 200 LBS | TEMPERATURE: 99 F

## 2023-12-04 DIAGNOSIS — S16.1XXA ACUTE CERVICAL MYOFASCIAL STRAIN, INITIAL ENCOUNTER: ICD-10-CM

## 2023-12-04 DIAGNOSIS — M54.2 NECK PAIN: ICD-10-CM

## 2023-12-04 DIAGNOSIS — S01.01XA LACERATION OF SCALP, INITIAL ENCOUNTER: Primary | ICD-10-CM

## 2023-12-04 PROCEDURE — 12002 RPR S/N/AX/GEN/TRNK2.6-7.5CM: CPT

## 2023-12-04 PROCEDURE — 99284 EMERGENCY DEPT VISIT MOD MDM: CPT | Mod: 25

## 2023-12-04 PROCEDURE — 25000003 PHARM REV CODE 250: Performed by: EMERGENCY MEDICINE

## 2023-12-04 RX ORDER — LIDOCAINE HYDROCHLORIDE 10 MG/ML
5 INJECTION INFILTRATION; PERINEURAL
Status: COMPLETED | OUTPATIENT
Start: 2023-12-04 | End: 2023-12-04

## 2023-12-04 RX ORDER — TRAMADOL HYDROCHLORIDE 50 MG/1
50 TABLET ORAL EVERY 6 HOURS PRN
Qty: 20 TABLET | Refills: 0 | Status: SHIPPED | OUTPATIENT
Start: 2023-12-04 | End: 2023-12-09

## 2023-12-04 RX ORDER — CYCLOBENZAPRINE HCL 10 MG
10 TABLET ORAL 3 TIMES DAILY PRN
Qty: 15 TABLET | Refills: 0 | Status: SHIPPED | OUTPATIENT
Start: 2023-12-04 | End: 2023-12-09

## 2023-12-04 RX ADMIN — LIDOCAINE HYDROCHLORIDE 5 ML: 10 INJECTION, SOLUTION INFILTRATION; PERINEURAL at 06:12

## 2023-12-05 NOTE — ED PROVIDER NOTES
"Encounter Date: 12/4/2023       History     Chief Complaint   Patient presents with    Head Injury     Pt complaint of laceration to top of head from a tree branch that fell this evening without LOC. Pt evaluated at Urgent Care and instructed to go to ed for further evaluation at concern of neck with hx of steelplates to neck for many years     The history is provided by the patient.   Head Injury   The incident occurred 2 to 3 hours ago. He came to the ER via by private vehicle. The injury mechanism was a direct blow. There was no loss of consciousness. The volume of blood lost was minimal. The quality of the pain is described as dull and throbbing. The pain has been constant since the injury. Pertinent negatives include no weakness.   Struck in head by tree branch.  No LOC, no blood thinners.  Sent from urgent care due to "neck stiffness" and h/o prior surgery to neck.  Tdap UTD.  No N/V.    Review of patient's allergies indicates:   Allergen Reactions    Penicillin Swelling     Past Medical History:   Diagnosis Date    Environmental allergies     Essential (primary) hypertension     Fatigue     Hypercholesteremia     Neck pain     Vitamin D deficiency      Past Surgical History:   Procedure Laterality Date    ANTERIOR CRUCIATE LIGAMENT REPAIR      Cervical vertebral fusion      CLOSED REDUCTION OF FRACTURE OF NASAL BONE      Closed [percutaneous] [needle] biopsy of kidney  02/15/2012    COLONOSCOPY  10/17/2011    Excision of accessory spleen  02/15/2012    EXPLORATORY LAPAROTOMY      LAPAROSCOPY  02/15/2012    NASAL SEPTOPLASTY      orchidectomy      Right foot      ROTATOR CUFF REPAIR      Unilateral adrenalectomy   02/15/2012     Family History   Problem Relation Age of Onset    Dementia Mother     Diabetes Mother     Blindness Father     Deafness Father     Stroke Father     Alcohol abuse Brother      Social History     Tobacco Use    Smoking status: Never    Smokeless tobacco: Never   Substance Use Topics    " Alcohol use: Not Currently    Drug use: Not Currently     Types: Marijuana     Review of Systems   Constitutional:  Negative for fever.   HENT:  Negative for sore throat.    Respiratory:  Negative for shortness of breath.    Cardiovascular:  Negative for chest pain.   Gastrointestinal:  Negative for nausea.   Genitourinary:  Negative for dysuria.   Musculoskeletal:  Negative for back pain.   Skin:  Negative for rash.   Neurological:  Negative for weakness.   Hematological:  Does not bruise/bleed easily.       Physical Exam     Initial Vitals [12/04/23 1801]   BP Pulse Resp Temp SpO2   121/74 81 18 98.6 °F (37 °C) 100 %      MAP       --         Physical Exam    Nursing note and vitals reviewed.  Constitutional: He appears well-developed and well-nourished.   HENT:   Head: Normocephalic and atraumatic.       Right Ear: External ear normal.   Left Ear: External ear normal.   Nose: Nose normal.   Eyes: Conjunctivae and EOM are normal. Pupils are equal, round, and reactive to light.   Neck: Neck supple.   Normal range of motion.  Cardiovascular:  Normal rate, regular rhythm, normal heart sounds and intact distal pulses.           Pulmonary/Chest: Breath sounds normal.   Abdominal: Abdomen is soft. Bowel sounds are normal.   Musculoskeletal:         General: Normal range of motion.      Cervical back: Normal range of motion and neck supple.     Neurological: He is alert and oriented to person, place, and time. He has normal strength. GCS score is 15. GCS eye subscore is 4. GCS verbal subscore is 5. GCS motor subscore is 6.   Skin: Skin is warm and dry. Capillary refill takes less than 2 seconds.   Psychiatric: He has a normal mood and affect. His behavior is normal. Judgment and thought content normal.         ED Course   Lac Repair    Date/Time: 12/4/2023 6:34 PM    Performed by: Jerald Bagley MD  Authorized by: Jerald Bagley MD    Consent:     Consent obtained:  Verbal    Consent given by:   Patient    Risks discussed:  Infection, pain, poor cosmetic result, poor wound healing and need for additional repair    Alternatives discussed:  No treatment  Universal protocol:     Procedure explained and questions answered to patient or proxy's satisfaction: yes      Patient identity confirmed:  Verbally with patient  Anesthesia:     Anesthesia method:  Local infiltration    Local anesthetic:  Lidocaine 1% w/o epi  Laceration details:     Location:  Scalp    Scalp location:  Frontal    Length (cm):  4    Depth (mm):  10  Pre-procedure details:     Preparation:  Patient was prepped and draped in usual sterile fashion  Exploration:     Limited defect created (wound extended): no      Hemostasis achieved with:  Direct pressure    Wound exploration: entire depth of wound visualized      Contaminated: no    Treatment:     Area cleansed with:  Povidone-iodine    Amount of cleaning:  Standard    Irrigation solution:  Sterile saline    Debridement:  None  Skin repair:     Repair method:  Staples    Number of staples:  7  Approximation:     Approximation:  Close  Repair type:     Repair type:  Simple  Post-procedure details:     Dressing:  Bulky dressing    Procedure completion:  Tolerated well, no immediate complications    Labs Reviewed - No data to display       Imaging Results              X-Ray Cervical Spine AP And Lateral (Final result)  Result time 12/04/23 18:42:25      Final result by Germaine Torres MD (12/04/23 18:42:25)                   Impression:      Extensive degenerative changes seen as outlined above.  Postsurgical changes as outlined above    There is some worsening of the anterior ceases of C3 on C4 when compared to the prior examination      Electronically signed by: Cayden Torres  Date:    12/04/2023  Time:    18:42               Narrative:    EXAMINATION:  XR CERVICAL SPINE AP LATERAL    CLINICAL HISTORY:  Cervicalgia    TECHNIQUE:  AP, lateral and open mouth views of the cervical spine  were performed.    COMPARISON:  06/04/2019    FINDINGS:  The patient is status post anterior spinal fusion at the level of C3-C4.  There is also fusion is C4-C5 but no spinal plate is seen that level.  Degenerative changes are seen at C5-C6 with endplate sclerosis, loss of disc height and anterior bridging osteophyte.  Multilevel facet arthropathy seen.  There is anterolisthesis of C3 on C4 that measures 4 mm.  This is slightly worse than the prior exam.  Odontoid lateral masses appear normal.                                       Medications   LIDOcaine HCL 10 mg/ml (1%) injection 5 mL (5 mLs Infiltration Given 12/4/23 1834)     Medical Decision Making  Amount and/or Complexity of Data Reviewed  Radiology: ordered and independent interpretation performed. Decision-making details documented in ED Course.    Risk  Prescription drug management.       Differential includes:  scalp laceration, contusion, cervical strain, fracture, concussion, ICH.  No indication for emergent head CT (no anticoagulants, no LOC, no N/V, no HA, normal neuro exam).  Will obtain C-spine x-ray and set up for wound closure.                               Clinical Impression:  Final diagnoses:  [M54.2] Neck pain  [S01.01XA] Laceration of scalp, initial encounter (Primary)  [S16.1XXA] Acute cervical myofascial strain, initial encounter          ED Disposition Condition    Discharge Stable          ED Prescriptions       Medication Sig Dispense Start Date End Date Auth. Provider    traMADoL (ULTRAM) 50 mg tablet Take 1 tablet (50 mg total) by mouth every 6 (six) hours as needed for Pain. 20 tablet 12/4/2023 12/9/2023 Jerald Bagley MD    cyclobenzaprine (FLEXERIL) 10 MG tablet Take 1 tablet (10 mg total) by mouth 3 (three) times daily as needed for Muscle spasms. 15 tablet 12/4/2023 12/9/2023 Jerald Bagley MD          Follow-up Information       Follow up With Specialties Details Why Contact Info    Staples out in one week                  Jerald Bagley MD  12/04/23 [id number]

## 2023-12-05 NOTE — ED TRIAGE NOTES
Pt complaint of laceration to top of head from a tree branch that fell this evening without LOC. Pt evaluated at Urgent Care and instructed to go to ed for further evaluation at concern of neck with hx of steelplates to neck for many years

## 2023-12-10 ENCOUNTER — HOSPITAL ENCOUNTER (EMERGENCY)
Facility: HOSPITAL | Age: 68
Discharge: HOME | End: 2023-12-10
Attending: EMERGENCY MEDICINE
Payer: MEDICARE

## 2023-12-10 VITALS
SYSTOLIC BLOOD PRESSURE: 148 MMHG | TEMPERATURE: 98 F | RESPIRATION RATE: 20 BRPM | HEART RATE: 89 BPM | DIASTOLIC BLOOD PRESSURE: 80 MMHG

## 2023-12-10 DIAGNOSIS — Z48.02: Primary | ICD-10-CM

## 2023-12-10 PROCEDURE — 99281 EMR DPT VST MAYX REQ PHY/QHP: CPT

## 2023-12-10 NOTE — ED PROVIDER NOTES
Encounter Date: 12/10/2023       History     Chief Complaint   Patient presents with    Suture / Staple Removal     Pt to er for staple removal from scalp. States staples have been in place for 6 days.     The history is provided by the patient.   Suture / Staple Removal   The sutures were placed 3 to 6 days ago. Treatments since wound repair include regular soap and water washings. There has been no drainage from the wound. There is no redness present. There is no swelling present. There is no pain present.   Complains of itching at the site, but otherwise no complaints.    Review of patient's allergies indicates:   Allergen Reactions    Penicillin Swelling     Past Medical History:   Diagnosis Date    Environmental allergies     Essential (primary) hypertension     Fatigue     Hypercholesteremia     Neck pain     Vitamin D deficiency      Past Surgical History:   Procedure Laterality Date    ANTERIOR CRUCIATE LIGAMENT REPAIR      Cervical vertebral fusion      CLOSED REDUCTION OF FRACTURE OF NASAL BONE      Closed [percutaneous] [needle] biopsy of kidney  02/15/2012    COLONOSCOPY  10/17/2011    Excision of accessory spleen  02/15/2012    EXPLORATORY LAPAROTOMY      LAPAROSCOPY  02/15/2012    NASAL SEPTOPLASTY      orchidectomy      Right foot      ROTATOR CUFF REPAIR      Unilateral adrenalectomy   02/15/2012     Family History   Problem Relation Age of Onset    Dementia Mother     Diabetes Mother     Blindness Father     Deafness Father     Stroke Father     Alcohol abuse Brother      Social History     Tobacco Use    Smoking status: Never    Smokeless tobacco: Never   Substance Use Topics    Alcohol use: Not Currently    Drug use: Not Currently     Types: Marijuana     Review of Systems   Constitutional:  Negative for fever.   HENT:  Negative for sore throat.    Respiratory:  Negative for shortness of breath.    Cardiovascular:  Negative for chest pain.   Gastrointestinal:  Negative for nausea.   Genitourinary:   Negative for dysuria.   Musculoskeletal:  Negative for back pain.   Skin:  Negative for rash.   Neurological:  Negative for weakness.   Hematological:  Does not bruise/bleed easily.       Physical Exam     Initial Vitals   BP Pulse Resp Temp SpO2   -- -- -- -- --      MAP       --         Physical Exam    Nursing note and vitals reviewed.  Constitutional: He appears well-developed and well-nourished.   HENT:   Head: Normocephalic and atraumatic.       Right Ear: External ear normal.   Left Ear: External ear normal.   Nose: Nose normal.   Eyes: Conjunctivae and EOM are normal. Pupils are equal, round, and reactive to light.   Neck: Neck supple.   Normal range of motion.  Cardiovascular:  Normal rate, regular rhythm, normal heart sounds and intact distal pulses.           Pulmonary/Chest: Breath sounds normal.   Abdominal: Abdomen is soft. Bowel sounds are normal.   Musculoskeletal:         General: Normal range of motion.      Cervical back: Normal range of motion and neck supple.     Neurological: He is alert and oriented to person, place, and time. He has normal strength. GCS score is 15. GCS eye subscore is 4. GCS verbal subscore is 5. GCS motor subscore is 6.   Skin: Skin is warm and dry. Capillary refill takes less than 2 seconds.   Psychiatric: He has a normal mood and affect. His behavior is normal. Judgment and thought content normal.         ED Course   Procedures  Labs Reviewed - No data to display       Imaging Results    None          Medications - No data to display  Medical Decision Making     Patient presents for staple removal.  Wound looks good.  Rn to remove staples and then D/C.                               Clinical Impression:  Final diagnoses:  [Z48.02] Encounter for removal of staples (Primary)          ED Disposition Condition    Discharge Stable          ED Prescriptions    None       Follow-up Information       Follow up With Specialties Details Why Contact Info    Follow up with your  primary care provider in 2 weeks if not improved                 Jerald Bagley MD  12/10/23 1188

## 2023-12-11 PROBLEM — Z00.00 MEDICARE ANNUAL WELLNESS VISIT, SUBSEQUENT: Status: RESOLVED | Noted: 2023-09-06 | Resolved: 2023-12-11

## 2024-03-06 NOTE — PROGRESS NOTES
.Follow-up (6 month recheck)         HPI:    Patient presents for 6 month recheck.    He has been feeling well.    Patient went to ER on 12/04/2023 for a scalp laceration.  His appetite has been good; he has been compliant with his diet.  He drinks a lot of water; he urinates between 0 and 2-3 times at night. He has decreased his coffee to a pot and 1/2 a day.  He has been bowling once a week.  He has been sleeping well; he sleeps too much at times. He is compliant and doing well on C-PAP.  He had a eye checkup in 2023 at Dr Manzano's.   Colonoscopy 10/17/2021; follow up in 5 years.    Current Outpatient Medications:     amLODIPine (NORVASC) 10 MG tablet, Take 1 tablet (10 mg total) by mouth once daily., Disp: 90 tablet, Rfl: 3    blood sugar diagnostic (ONETOUCH ULTRA TEST) Strp, To test blood glucose once daily. Dx: code: E11.9 (Patient is NON INSULIN DEPENDENT), Disp: 100 strip, Rfl: 11    blood-glucose meter kit, To check BG 2  times daily, to use with insurance preferred meter, Disp: 1 each, Rfl: 0    cholecalciferol, vitamin D3, (VITAMIN D3) 25 mcg (1,000 unit) capsule, Take 25 mcg by mouth once daily., Disp: , Rfl:     citalopram (CELEXA) 40 MG tablet, Take 1 tablet (40 mg total) by mouth once daily., Disp: 90 tablet, Rfl: 3    isosorbide mononitrate (IMDUR) 30 MG 24 hr tablet, Take 30 mg by mouth., Disp: , Rfl:     meloxicam (MOBIC) 7.5 MG tablet, Take 1 tablet (7.5 mg total) by mouth once daily., Disp: 90 tablet, Rfl: 3    metFORMIN (GLUCOPHAGE) 1000 MG tablet, Take 1 tablet (1,000 mg total) by mouth 2 (two) times daily with meals., Disp: 180 tablet, Rfl: 3    nitroGLYCERIN (NITROSTAT) 0.4 MG SL tablet, Place 0.4 mg under the tongue every 5 (five) minutes as needed for Chest pain., Disp: , Rfl:     ONETOUCH DELICA PLUS LANCET 33 gauge Misc, To test blood glucose once daily. Dx Code: E11.9 (Patient is NON INSULIN DEPENDENT), Disp: 100 each, Rfl: 11    ONETOUCH ULTRA2 METER Misc, USE TO TEST BLOOD SUGAR 4  TIMES DAILY, Disp: , Rfl:     pyridoxine, vitamin B6, (VITAMIN B-6) 100 MG Tab, Take 50 mg by mouth once daily., Disp: , Rfl:     rosuvastatin (CRESTOR) 20 MG tablet, Take 1 tablet (20 mg total) by mouth every evening., Disp: 90 tablet, Rfl: 3    aspirin (ECOTRIN) 81 MG EC tablet, Take 1 tablet (81 mg total) by mouth once daily., Disp: 30 tablet, Rfl: 11      ROS:    Dr Hidalgo started him on isosorbide a few month ago. He feels clammy and cold since being on this medication.   He has been tired and sleeping more the past few months.      PE:    ..  Vitals:    03/08/24 0759   BP: 128/83   Pulse: 72   Temp: 97.6 °F (36.4 °C)        General:  He is a well-developed well-nourished white male in no apparent distress.  He is alert and oriented.    Chest:  Clear to auscultation bilaterally.    CV: Regular rate rhythm without murmurs rubs or gallops.  Protective Sensation (w/ 10 gram monofilament):  Right:  Absent  Left:  Decreased    Visual Inspection:  Right:  Diffuse onychomycosis, + callus formation  Left:  Callus formation    Pedal Pulses:   Right: Present  Left: Present    Posterior Tibialis Pulses:   Right:Present  Left: Present         1. Type 2 diabetes mellitus with diabetic polyneuropathy, without long-term current use of insulin  Overview:  Labs 11/28/2022:   A1c 6.1.  Microalbumin 30 milligrams/liter.  Foot exam performed today.    Last eye exam: Sees some one at Dr Manzano's office; 2022.     09/08/2023:  Continue ADA diet; limit intake of sugary foods and refined carbohydrates.    Patient congratulated and encouraged to continue to lose weight.    Last eye exam:  05/29/2023 with Dr. Gimenez at Dr Manzano's office.  Foot exam performed today.  A1c, microalbumin and lipid profile pending.    03/2024: Continue ADA diet; limit intake of sugary foods and refined carbohydrates.    Last eye exam:  05/29/2023 with Dr. Gimenez at Dr Manzano's office.   Foot exam performed today; bilateral decreased sensation.  Proper foot care  discussed with patient.  A1c pending.      Orders:  -     Hemoglobin A1C; Future; Expected date: 03/08/2024    2. Essential (primary) hypertension  Overview:  His blood pressure is well controlled; continue current medication.    Limit sodium consumption.    Patient congratulated and encouraged to continue to lose weight.    03/08/2024:  His blood pressure is well controlled; continue current medication.      3. Obstructive sleep apnea  Overview:  Stable; followed by Dr. Vazquez.  Patient will be getting a new machine on Friday.    09/08/2023:  Patient continues to be compliant with and doing well CPAP.    Followed by Dr. Vazquez.    03/2024: Patient is compliant with and doing well on CPAP therapy.      4. Fatigue, unspecified type  Overview:  Patient reports fatigue and: His tolerance with the past few months.    TSH, CBC and B12 levels pending.    Orders:  -     TSH; Future; Expected date: 03/08/2024  -     CBC Auto Differential; Future; Expected date: 03/08/2024  -     Vitamin B12; Future; Expected date: 03/08/2024    5. Bilateral carotid artery stenosis  Overview:  Followed by Dr Hidalgo; last office visit 11/2022.    03/2024: Followed by Dr. Hidalgo.    Patient advised to resume baby aspirin daily.      6. Atherosclerosis of aorta  Overview:  Stable; followed by Cardiology, Dr Hidalgo.    03/8/2024:  Stable; followed by Cardiology, Dr Hidalgo.  Patient advised to resume baby aspirin daily.      Other orders  -     aspirin (ECOTRIN) 81 MG EC tablet; Take 1 tablet (81 mg total) by mouth once daily.  Dispense: 30 tablet; Refill: 11              ..Follow up in about 6 months (around 9/8/2024) for Wellness exam fasting.

## 2024-03-08 ENCOUNTER — OFFICE VISIT (OUTPATIENT)
Dept: PRIMARY CARE CLINIC | Facility: CLINIC | Age: 69
End: 2024-03-08
Payer: MEDICARE

## 2024-03-08 VITALS
SYSTOLIC BLOOD PRESSURE: 128 MMHG | HEIGHT: 71 IN | TEMPERATURE: 98 F | OXYGEN SATURATION: 98 % | HEART RATE: 72 BPM | BODY MASS INDEX: 27.72 KG/M2 | DIASTOLIC BLOOD PRESSURE: 83 MMHG | WEIGHT: 198 LBS

## 2024-03-08 DIAGNOSIS — I70.0 ATHEROSCLEROSIS OF AORTA: ICD-10-CM

## 2024-03-08 DIAGNOSIS — G47.33 OBSTRUCTIVE SLEEP APNEA: ICD-10-CM

## 2024-03-08 DIAGNOSIS — R53.83 FATIGUE, UNSPECIFIED TYPE: ICD-10-CM

## 2024-03-08 DIAGNOSIS — E11.42 TYPE 2 DIABETES MELLITUS WITH DIABETIC POLYNEUROPATHY, WITHOUT LONG-TERM CURRENT USE OF INSULIN: Primary | ICD-10-CM

## 2024-03-08 DIAGNOSIS — I10 ESSENTIAL (PRIMARY) HYPERTENSION: ICD-10-CM

## 2024-03-08 DIAGNOSIS — I65.23 BILATERAL CAROTID ARTERY STENOSIS: ICD-10-CM

## 2024-03-08 LAB
BASOPHILS # BLD AUTO: 0.05 X10(3)/MCL
BASOPHILS NFR BLD AUTO: 0.6 %
EOSINOPHIL # BLD AUTO: 0.13 X10(3)/MCL (ref 0–0.9)
EOSINOPHIL NFR BLD AUTO: 1.6 %
ERYTHROCYTE [DISTWIDTH] IN BLOOD BY AUTOMATED COUNT: 13.2 % (ref 11.5–17)
EST. AVERAGE GLUCOSE BLD GHB EST-MCNC: 105.4 MG/DL
HBA1C MFR BLD: 5.3 %
HCT VFR BLD AUTO: 50 % (ref 42–52)
HGB BLD-MCNC: 16.5 G/DL (ref 14–18)
IMM GRANULOCYTES # BLD AUTO: 0.03 X10(3)/MCL (ref 0–0.04)
IMM GRANULOCYTES NFR BLD AUTO: 0.4 %
LYMPHOCYTES # BLD AUTO: 2.3 X10(3)/MCL (ref 0.6–4.6)
LYMPHOCYTES NFR BLD AUTO: 29 %
MCH RBC QN AUTO: 31.9 PG (ref 27–31)
MCHC RBC AUTO-ENTMCNC: 33 G/DL (ref 33–36)
MCV RBC AUTO: 96.7 FL (ref 80–94)
MONOCYTES # BLD AUTO: 0.92 X10(3)/MCL (ref 0.1–1.3)
MONOCYTES NFR BLD AUTO: 11.6 %
NEUTROPHILS # BLD AUTO: 4.49 X10(3)/MCL (ref 2.1–9.2)
NEUTROPHILS NFR BLD AUTO: 56.8 %
NRBC BLD AUTO-RTO: 0 %
PLATELET # BLD AUTO: 350 X10(3)/MCL (ref 130–400)
PMV BLD AUTO: 10.3 FL (ref 7.4–10.4)
RBC # BLD AUTO: 5.17 X10(6)/MCL (ref 4.7–6.1)
TSH SERPL-ACNC: 2.09 UIU/ML (ref 0.35–4.94)
VIT B12 SERPL-MCNC: 348 PG/ML (ref 213–816)
WBC # SPEC AUTO: 7.92 X10(3)/MCL (ref 4.5–11.5)

## 2024-03-08 PROCEDURE — 82607 VITAMIN B-12: CPT | Performed by: FAMILY MEDICINE

## 2024-03-08 PROCEDURE — 83036 HEMOGLOBIN GLYCOSYLATED A1C: CPT | Performed by: FAMILY MEDICINE

## 2024-03-08 PROCEDURE — 85025 COMPLETE CBC W/AUTO DIFF WBC: CPT | Performed by: FAMILY MEDICINE

## 2024-03-08 PROCEDURE — 84443 ASSAY THYROID STIM HORMONE: CPT | Performed by: FAMILY MEDICINE

## 2024-03-08 PROCEDURE — 99214 OFFICE O/P EST MOD 30 MIN: CPT | Mod: ,,, | Performed by: FAMILY MEDICINE

## 2024-03-08 PROCEDURE — 36415 COLL VENOUS BLD VENIPUNCTURE: CPT | Performed by: FAMILY MEDICINE

## 2024-03-08 RX ORDER — ASPIRIN 81 MG/1
81 TABLET ORAL DAILY
Qty: 30 TABLET | Refills: 11
Start: 2024-03-08 | End: 2025-03-08

## 2024-03-08 RX ORDER — ISOSORBIDE MONONITRATE 30 MG/1
30 TABLET, EXTENDED RELEASE ORAL DAILY
COMMUNITY
Start: 2024-03-03

## 2024-03-08 NOTE — Clinical Note
Please obtain actual eye examination report from Dr. Gimenez at Dr Manzano's office. Please obtain colonoscopy report from 2021: [facility].

## 2024-03-14 ENCOUNTER — DOCUMENTATION ONLY (OUTPATIENT)
Dept: PRIMARY CARE CLINIC | Facility: CLINIC | Age: 69
End: 2024-03-14
Payer: MEDICARE

## 2024-03-19 ENCOUNTER — DOCUMENTATION ONLY (OUTPATIENT)
Dept: PRIMARY CARE CLINIC | Facility: CLINIC | Age: 69
End: 2024-03-19
Payer: MEDICARE

## 2024-04-18 ENCOUNTER — TELEPHONE (OUTPATIENT)
Dept: PRIMARY CARE CLINIC | Facility: CLINIC | Age: 69
End: 2024-04-18
Payer: MEDICARE

## 2024-04-18 NOTE — TELEPHONE ENCOUNTER
Spoke with pt. Pt states BS 74 in early morning Instructed to eat breakfast prior to taking DM meds. Pt states asymptomatic

## 2024-04-19 ENCOUNTER — PATIENT MESSAGE (OUTPATIENT)
Dept: NEUROLOGY | Facility: CLINIC | Age: 69
End: 2024-04-19
Payer: MEDICARE

## 2024-04-22 ENCOUNTER — OFFICE VISIT (OUTPATIENT)
Dept: NEUROLOGY | Facility: CLINIC | Age: 69
End: 2024-04-22
Payer: MEDICARE

## 2024-04-22 VITALS
HEIGHT: 71 IN | DIASTOLIC BLOOD PRESSURE: 82 MMHG | WEIGHT: 197 LBS | BODY MASS INDEX: 27.58 KG/M2 | SYSTOLIC BLOOD PRESSURE: 118 MMHG

## 2024-04-22 DIAGNOSIS — G47.52 REM SLEEP BEHAVIOR DISORDER: ICD-10-CM

## 2024-04-22 DIAGNOSIS — G47.33 OBSTRUCTIVE SLEEP APNEA: Primary | ICD-10-CM

## 2024-04-22 PROCEDURE — 99213 OFFICE O/P EST LOW 20 MIN: CPT | Mod: PBBFAC | Performed by: NURSE PRACTITIONER

## 2024-04-22 PROCEDURE — 99999 PR PBB SHADOW E&M-EST. PATIENT-LVL III: CPT | Mod: PBBFAC,,, | Performed by: NURSE PRACTITIONER

## 2024-04-22 PROCEDURE — 99214 OFFICE O/P EST MOD 30 MIN: CPT | Mod: S$PBB,,, | Performed by: NURSE PRACTITIONER

## 2024-04-22 NOTE — PROGRESS NOTES
Established GUY Patient   SUBJECTIVE:    Patient ID: Micah Mendoza , 68 y.o.    Past Medical History:   Diagnosis Date    Environmental allergies     Essential (primary) hypertension     Fatigue     Hypercholesteremia     Neck pain     Vitamin D deficiency        Past Surgical History:   Procedure Laterality Date    ANTERIOR CRUCIATE LIGAMENT REPAIR      Cervical vertebral fusion      CLOSED REDUCTION OF FRACTURE OF NASAL BONE      Closed [percutaneous] [needle] biopsy of kidney  02/15/2012    COLONOSCOPY  10/17/2011    Excision of accessory spleen  02/15/2012    EXPLORATORY LAPAROTOMY      LAPAROSCOPY  02/15/2012    NASAL SEPTOPLASTY      orchidectomy      Right foot      ROTATOR CUFF REPAIR      Unilateral adrenalectomy   02/15/2012       Review of patient's allergies indicates:   Allergen Reactions    Penicillin Swelling       Chief Complaint: GUY f/u    History of Present Illness:     Here for PAP follow up. Sleeping well at night with PAP. Feels better the following day after PAP use. Some refreshed awakenings, denies EDS; c/o nightmares almost nightly.   Tolerating mask/pressure well. Needs supply order[place] [facility] for compliance download   Denies problems with PAP machine. Able to get supplies    Review of Systems - as per HPI, otherwise a balanced 10 systems review is negative.      Current Medications:  Current Outpatient Medications   Medication Instructions    amLODIPine (NORVASC) 10 mg, Oral, Daily    aspirin (ECOTRIN) 81 mg, Oral, Daily    blood sugar diagnostic (ONETOUCH ULTRA TEST) Strp To test blood glucose once daily. Dx: code: E11.9 (Patient is NON INSULIN DEPENDENT)    blood-glucose meter kit To check BG 2  times daily, to use with insurance preferred meter    cholecalciferol (vitamin D3) (VITAMIN D3) 25 mcg, Oral, Daily    citalopram (CELEXA) 40 mg, Oral, Daily    isosorbide mononitrate (IMDUR) 30 mg, Oral, Daily    meloxicam (MOBIC) 7.5 mg, Oral, Daily    metFORMIN  "(GLUCOPHAGE) 1,000 mg, Oral, 2 times daily with meals    nitroGLYCERIN (NITROSTAT) 0.4 mg, Sublingual, Every 5 min PRN    ONETOUCH DELICA PLUS LANCET 33 gauge Misc To test blood glucose once daily. Dx Code: E11.9 (Patient is NON INSULIN DEPENDENT)    ONETOUCH ULTRA2 METER Misc USE TO TEST BLOOD SUGAR 4 TIMES DAILY    pyridoxine (vitamin B6) (VITAMIN B-6) 50 mg, Oral, Daily    rosuvastatin (CRESTOR) 20 mg, Oral, Nightly         OBJECTIVE:    Vitals:  /82 (BP Location: Left arm, Patient Position: Sitting)   Ht 5' 11" (1.803 m)   Wt 89.4 kg (197 lb)   BMI 27.48 kg/m²      Physical Exam:  Constitutional  he appears well-developed and well-nourished. he is well groomed.    Accompanied by - self  Appearance - well appearing, no apparent distress, unassisted  Heart - RRR auscultated without murmur  Lungs - CTA   Skin- no obvious lesions noted    Neurologic  Cortical function - The patient is alert, attentive   Speech - clear   Cranial nerves:  CN 3, 4, 6 EOMs - normal. No ptosis or lateral gaze deviation  CN 7 - no face asymmetry   CN 8 - hearing is grossly normal  Gait - unassisted; posture upright. gait is steady with normal steps    Review of Data:            4/19/2024    11:17 AM   EPWORTH SLEEPINESS SCALE   Sitting and reading 1   Watching TV 1   Sitting, inactive in a public place (e.g. a theatre or a meeting) 0   As a passenger in a car for an hour without a break 1   Lying down to rest in the afternoon when circumstances permit 1   Sitting and talking to someone 0   Sitting quietly after a lunch without alcohol 1   In a car, while stopped for a few minutes in traffic 0   Total score 5        ASSESSMENT /PLAN:    Problem List Items Addressed This Visit          Other    Obstructive sleep apnea - Primary    Overview     - Continues to benefit from PAP therapy. Encouraged nightly use of PAP.   - Drowsy driving may still occur despite PAP use.   - F/u in 1 yr        REM sleep behavior disorder    Suggested he " try melatonin OTC. Start with 3mg, and can increase to up to 10mg nightly. Take immediately prior to sleep onset.            Questions and concerns were sought and answered to the patient's stated verbal satisfaction.    The patient verbalizes understanding and agreement with the above stated treatment plan.   Items discussed include acute and/or chronic neurological, sleep, or other issues and their attendant differential diagnoses.  Potential for additional testing, treatment options, and prognosis also discussed.    ___single dx _*__multiple issues/ diagnoses  ___ low __* mod ___ high complexity of data  __*_low __mod ___ high risks     Medical Decision Making (MDM) used for CPT choice:  ___low  _*__moderate  ____high        FERNANDA Roca  [facility]  [phone]

## 2024-06-03 LAB
LEFT EYE DM RETINOPATHY: NEGATIVE
RIGHT EYE DM RETINOPATHY: NEGATIVE

## 2024-06-06 NOTE — PROGRESS NOTES
"Hip Pain (Left hip pain x 6 weeks) and Fatigue       HPI:    Patient reports low back pain, frequency and urgency for the past 6-8 weeks.  He denies any dysuria or hematuria.  He has occasional chills; he denies fever.  He has occasional loose stools.  He has a history of kidney stones.  Patient denies any hesitancy or decreased stream.  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      Current Outpatient Medications   Medication Instructions    amLODIPine (NORVASC) 10 mg, Oral, Daily    aspirin (ECOTRIN) 81 mg, Oral, Daily    blood sugar diagnostic (ONETOUCH ULTRA TEST) Strp To test blood glucose once daily. Dx: code: E11.9 (Patient is NON INSULIN DEPENDENT)    blood-glucose meter kit To check BG 2  times daily, to use with insurance preferred meter    cholecalciferol (vitamin D3) (VITAMIN D3) 25 mcg, Oral, Daily    citalopram (CELEXA) 40 mg, Oral, Daily    isosorbide mononitrate (IMDUR) 30 mg, Oral, Daily    meloxicam (MOBIC) 7.5 mg, Oral, Daily    metFORMIN (GLUCOPHAGE) 1,000 mg, Oral, 2 times daily with meals    nitroGLYCERIN (NITROSTAT) 0.4 mg, Sublingual, Every 5 min PRN    ONETOUCH DELICA PLUS LANCET 33 gauge Misc To test blood glucose once daily. Dx Code: E11.9 (Patient is NON INSULIN DEPENDENT)    ONETOUCH ULTRA2 METER Misc USE TO TEST BLOOD SUGAR 4 TIMES DAILY    pyridoxine (vitamin B6) (VITAMIN B-6) 50 mg, Oral, Daily    rosuvastatin (CRESTOR) 20 mg, Oral, Nightly         ROS:    Patient reports fatigue during this time.      PE:    ..Visit Vitals  /86   Pulse 76   Temp 98 °F (36.7 °C)   Ht 5' 11" (1.803 m)   Wt 93.8 kg (206 lb 11.2 oz)   SpO2 97%   BMI 28.83 kg/m²        General:  He is well-developed well-nourished white male in no apparent distress.  He is alert and oriented.  Low back:  Tender to palpation over left lateral quadratus lumborum/flank region.  No midline or paraspinal tenderness noted.  No increased discomfort with flexion/extension or bilateral " lateral flexion.  Abdomen:  No suprapubic discomfort noted.        1. Acute left-sided low back pain without sciatica  Overview:  Patient with lower left back discomfort for 6-8 weeks.  Patient has discomfort over his left lateral quadratus lumborum muscle/flank area on examination.    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  Rule out kidney stone; CT of abdomen pelvis ordered.  Labs pending.  ER precautions given.      2. Urinary frequency  Overview:  Patient reports urinary frequency, urgency and left lower back discomfort with the past 6-8 weeks.  Patient with history of kidney stones.    CBC, urinalysis and urine culture pending.    CT of abdomen pelvis pending.  ER precautions given.    Orders:  -     CBC Auto Differential  -     Urinalysis; Future; Expected date: 06/10/2024  -     Urine culture; Future; Expected date: 06/10/2024    3. Left lower quadrant abdominal pain  Overview:  Patient's low back pain radiates to his lower abdomen and groin.  CT of abdomen pelvis pending.    Orders:  -     CT Abdomen Pelvis  Without Contrast; Future; Expected date: 06/10/2024              ..No follow-ups on file.       Future Appointments   Date Time Provider Department Center   6/10/2024  4:00 PM LAB, [facility] ST[facility]   9/13/2024  8:00 [facility]    4/21/2025  8:30 AM Katie Connell FNP-C [facility] [address]ry

## 2024-06-10 ENCOUNTER — OFFICE VISIT (OUTPATIENT)
Dept: PRIMARY CARE CLINIC | Facility: CLINIC | Age: 69
End: 2024-06-10
Payer: MEDICARE

## 2024-06-10 ENCOUNTER — LAB VISIT (OUTPATIENT)
Dept: LAB | Facility: HOSPITAL | Age: 69
End: 2024-06-10
Attending: FAMILY MEDICINE
Payer: MEDICARE

## 2024-06-10 VITALS
TEMPERATURE: 98 F | BODY MASS INDEX: 28.94 KG/M2 | OXYGEN SATURATION: 97 % | WEIGHT: 206.69 LBS | DIASTOLIC BLOOD PRESSURE: 86 MMHG | HEART RATE: 76 BPM | HEIGHT: 71 IN | SYSTOLIC BLOOD PRESSURE: 136 MMHG

## 2024-06-10 DIAGNOSIS — R35.0 URINARY FREQUENCY: ICD-10-CM

## 2024-06-10 DIAGNOSIS — M54.50 ACUTE LEFT-SIDED LOW BACK PAIN WITHOUT SCIATICA: Primary | ICD-10-CM

## 2024-06-10 DIAGNOSIS — R10.32 LEFT LOWER QUADRANT ABDOMINAL PAIN: ICD-10-CM

## 2024-06-10 LAB
BACTERIA #/AREA URNS AUTO: ABNORMAL /HPF
BASOPHILS # BLD AUTO: 0.04 X10(3)/MCL
BASOPHILS NFR BLD AUTO: 0.4 %
BILIRUB UR QL STRIP.AUTO: NEGATIVE
CLARITY UR: CLEAR
COLOR UR AUTO: ABNORMAL
EOSINOPHIL # BLD AUTO: 0.13 X10(3)/MCL (ref 0–0.9)
EOSINOPHIL NFR BLD AUTO: 1.4 %
ERYTHROCYTE [DISTWIDTH] IN BLOOD BY AUTOMATED COUNT: 13.2 % (ref 11.5–17)
GLUCOSE UR QL STRIP: NORMAL
HCT VFR BLD AUTO: 47.3 % (ref 42–52)
HGB BLD-MCNC: 16 G/DL (ref 14–18)
HGB UR QL STRIP: ABNORMAL
HYALINE CASTS #/AREA URNS LPF: ABNORMAL /LPF
IMM GRANULOCYTES # BLD AUTO: 0.03 X10(3)/MCL (ref 0–0.04)
IMM GRANULOCYTES NFR BLD AUTO: 0.3 %
KETONES UR QL STRIP: NEGATIVE
LEUKOCYTE ESTERASE UR QL STRIP: NEGATIVE
LYMPHOCYTES # BLD AUTO: 2.32 X10(3)/MCL (ref 0.6–4.6)
LYMPHOCYTES NFR BLD AUTO: 24.9 %
MCH RBC QN AUTO: 33.2 PG (ref 27–31)
MCHC RBC AUTO-ENTMCNC: 33.8 G/DL (ref 33–36)
MCV RBC AUTO: 98.1 FL (ref 80–94)
MONOCYTES # BLD AUTO: 1.17 X10(3)/MCL (ref 0.1–1.3)
MONOCYTES NFR BLD AUTO: 12.6 %
MUCOUS THREADS URNS QL MICRO: ABNORMAL /LPF
NEUTROPHILS # BLD AUTO: 5.61 X10(3)/MCL (ref 2.1–9.2)
NEUTROPHILS NFR BLD AUTO: 60.4 %
NITRITE UR QL STRIP: NEGATIVE
NRBC BLD AUTO-RTO: 0 %
PH UR STRIP: 5.5 [PH]
PLATELET # BLD AUTO: 323 X10(3)/MCL (ref 130–400)
PMV BLD AUTO: 9.8 FL (ref 7.4–10.4)
PROT UR QL STRIP: NEGATIVE
RBC # BLD AUTO: 4.82 X10(6)/MCL (ref 4.7–6.1)
RBC #/AREA URNS AUTO: ABNORMAL /HPF
SP GR UR STRIP.AUTO: 1.01 (ref 1–1.03)
SQUAMOUS #/AREA URNS LPF: ABNORMAL /HPF
UROBILINOGEN UR STRIP-ACNC: NORMAL
WBC # SPEC AUTO: 9.3 X10(3)/MCL (ref 4.5–11.5)
WBC #/AREA URNS AUTO: ABNORMAL /HPF

## 2024-06-10 PROCEDURE — 36415 COLL VENOUS BLD VENIPUNCTURE: CPT | Performed by: FAMILY MEDICINE

## 2024-06-10 PROCEDURE — 99214 OFFICE O/P EST MOD 30 MIN: CPT | Mod: ,,, | Performed by: FAMILY MEDICINE

## 2024-06-10 PROCEDURE — 81001 URINALYSIS AUTO W/SCOPE: CPT

## 2024-08-19 ENCOUNTER — PATIENT OUTREACH (OUTPATIENT)
Facility: CLINIC | Age: 69
End: 2024-08-19
Payer: MEDICARE

## 2024-08-19 NOTE — PROGRESS NOTES
Health Maintenance Topic(s) Outreach Outcomes & Actions Taken:  Chart review    Colorectal Cancer Screening - Outreach Outcomes & Actions Taken  : Requested  Colonoscopy Dr Srinivasan           Additional Notes:           Care Management, Digital Medicine, and/or Education Referrals  Eligible for Diabetic Educ.  Digital Medicine HTN, DM

## 2024-08-19 NOTE — LETTER
"  This communication is flagged as high priority.        AUTHORIZATION FOR RELEASE OF   CONFIDENTIAL INFORMATION    Dear YELENA,    We are seeing Micah Mendoza, date of birth 1955, in the clinic at [facility]. Demetrio Miller MD is the patient's PCP. Micah Mendoza has an outstanding lab/procedure at the time we reviewed his chart. In order to help keep his health information updated, he has authorized us to request the following medical record(s):                                 ( xx )  COLONOSCOPY  most recent          Please fax records to [facility], Manuel, Chad B., MD,  [phone]  Attn: Arlyn      If you have any questions, please contact Prasanth Neri" JulianneCare Coordinator @ [phone]      Patient Name: Micah Mendoza  : 1955  Patient Phone #: [phone]                             Micah Mendoza  MRN: [id number]  : 1955  Age: 67 y.o.  Sex: male         Patient/Legal Guardian Signature  This signature was collected at 2023    Micah Mendoza       _______________________________   Printed Name/Relationship to Patient      Consent for Examination and Treatment: I hereby authorize the providers and employees of [facility] ([facility]) to provide medical treatment/services which includes, but is not limited to, performing and administering tests and diagnostic procedures that are deemed necessary, including, but not limited to, imaging examinations, blood tests and other laboratory procedures as may be required by the hospital, clinic, or may be ordered by my physician(s) or persons working under the general and/or special instructions of my physician(s).      I understand and agree that this consent covers all authorized persons, including but not limited to physicians, residents, nurse practitioners, physicians' assistants, specialists, consultants, student nurses, and independently contracted physicians, who are called upon by the physician in charge, to carry out " the diagnostic procedures and medical or surgical treatment.     I hereby authorize [facility] to retain or dispose of any specimens or tissue, should there be such remaining from any test or procedure.     I hereby authorize and give consent for [facility] providers and employees to take photographs, images or videotapes of such diagnostic, surgical or treatment procedures of Patient as may be required by [facility] or as may be ordered by a physician. I further acknowledge and agree that [facility] may use cameras or other devices for patient monitoring.     I am aware that the practice of medicine is not an exact science, and I acknowledge that no guarantees have been made to me as to the outcome of any tests, procedures or treatment.     Authorization for Release of Information: I understand that my insurance company and/or their agents may need information necessary to make determinations about payment/reimbursement. I hereby provide authorization to release to all insurance companies, their successors, assignees, other parties with whom they may have contracted, or others acting on their behalf, that are involved with payment for any hospital and/or clinic charges incurred by the patient, any information that they request and deem necessary for payment/reimbursement, and/or quality review.  I further authorize the release of my health information to physicians or other health care practitioners on staff who are involved in my health care now and in the future, and to other health care providers, entities, or institutions for the purpose of my continued care and treatment, including referrals.     REGISTRATION AUTHORIZATION  Form No. 46050 (Rev. 7/13/2022)       Medicare Patient's Certification and Authorization to Release Information and Payment Request:  I certify that the information given by me in applying for payment under Title XVIII of the Social Security Act is correct. I authorize any maza of medical or other  information about me to release to the Social [facility], or its intermediaries or carriers, any information needed for this or a related Medicare claim. I request that payment of authorized benefits be made on my behalf.     Assignment of Insurance Benefits:   I hereby authorize any and all insurance companies, health plans, defined   benefit plans, health insurers or any entity that is or may be responsible for payment of my medical expenses to pay all hospital and medical benefits now due, and to become due and payable to me under any hospital benefits, sick benefits, injury benefits or any other benefit for services rendered to me, including Major Medical Benefits, direct to [facility] and all independently contracted physicians. I assign any and all rights that I may have against any and all insurance companies, health plans, defined benefit plans, health insurers or any entity that is or may be responsible for payment of my medical expenses, including, but not limited to any right to appeal a denial of a claim, any right to bring any action, lawsuit, administrative proceeding, or other cause of action on my behalf. I specifically assign my right to pursue litigation against any and all insurance companies, health plans, defined benefit plans, health insurers or any entity that is or may be responsible for payment of my medical expenses based upon a refusal to pay charges.            E. Valuables: It is understood and agreed that [facility] is not liable for the damage to or loss of any money, jewelry,   documents, dentures, eye glasses, hearing aids, prosthetics, or other property of value.     F. Computer Equipment: I understand and agree that should I choose to use computer equipment owned by [facility] or if I choose to access the Internet via [facility]s network, I do so at my own risk. [facility] is not responsible for any damage to my computer equipment or to any damages of any type that might arise from  my loss of equipment or data.     G. Acceptance of Financial Responsibility:  I agree that in consideration of the services and   supplies that have been   or will be furnished to the patient, I am hereby obligated to pay all charges made for or on the account of the patient according to the standard rates (in effect at the time the services and supplies are delivered) established by [facility], including its Patient Financial Assistance Policy to the extent it is applicable. I understand that I am responsible for all charges, or portions thereof, not covered by insurance or other sources. Patient refunds will be distributed only after balances at all [facility] facilities are paid.     H. Communication Authorization:  I hereby authorize [facility] and its representatives, along with any billing service   or  who may work on their behalf, to contact me on   my cell phone and/or home phone using pre- recorded messages, artificial voice messages, automatic telephone dialing devices or other computer assisted technology, or by electronic      mail, text messaging, or by any other form of electronic communication. This includes, but is not limited to, appointment reminders, yearly physical exam reminders, preventive care reminders, patient campaigns, welcome calls, and calls about account balances on my account or any account on which I am listed as a guarantor. I understand I have the right to opt out of these communications at any time.      Relationship  Between  Facility and  Provider:      I understand that some, but not all, providers furnishing services to the patient are not employees or agents of [facility]. The patient is under the care and supervision of his/her attending physician, and it is the responsibility of the facility and its nursing staff to carry out the instructions of such physicians. It is the responsibility of the patient's physician/designee to obtain the patient's informed consent, when  required, for medical or surgical treatment, special diagnostic or therapeutic procedures, or hospital services rendered for the patient under the special instructions of the physician/designee.     REGISTRATION AUTHORIZATION  Form No. 23915 (Rev. 7/13/2022)      Notice of Privacy Practices: I acknowledge I have received a copy of [facility]'s Notice of Privacy Practices.     Facility  Directory: I have discussed with the organization my desire to be either included or excluded  in the facility directory in the event of my being an inpatient at an [facility] facility. I understand that if my choice is to opt-out of being identified in the facility directory that the facility will not provide any information about me such as my condition (e.g. fair, stable, etc.) or my location in the facility (e.g., room number, department).     Immunizations: [facility] shares immunization information with state sponsored health departments to help you and your doctor keep track of your immunization records. By signing, you consent to have this information shared with the health department in your state:                                [place] - LINKS ([place] Immunization Network for Kids Statewide)                                [place] - MIIX ([place] Immunization Information eXchange)                                [place] - ImmPRINT (Immunization Patient Registry with Integrated Technology)     TERM: This authorization is valid for this and subsequent care/treatment I receive at [facility] and will remain valid unless/until revoked in writing by me.     [facility]: As used in this document, [facility] means all [facility] owned and managed facilities, including, but not limited to, all health centers, surgery centers, clinics, urgent care centers, and hospitals.         [facility] complies with applicable Federal civil rights laws and does not discriminate on the basis of race, color, national origin,  age, disability, or sex.  ATENCIÓN: si habla español, tiene a betancourt disposición servicios gratuitos de asistencia lingüística. Alyson al [phone].  CHÚ Ý: N?u b?n nói Ti?ng Vi?t, có các d?ch v? h? tr? ngôn ng? mi?n phí dành cho b?n. G?i s? [phone].        REGISTRATION AUTHORIZATION  Form No. 27883 (Rev. 7/13/2022)

## 2024-08-21 ENCOUNTER — DOCUMENTATION ONLY (OUTPATIENT)
Dept: PRIMARY CARE CLINIC | Facility: CLINIC | Age: 69
End: 2024-08-21
Payer: MEDICARE

## 2024-08-21 LAB — CRC RECOMMENDATION EXT: NORMAL

## 2024-08-21 NOTE — PROGRESS NOTES
"Flank Pain (Kidney stones pain x 1 month having a hard time sleeping/Wants something to pass stones)       HPI:    Patient presents with bilateral flank pain, left-greater-than-right.  He has had this for one-month.  CT scan in June revealed a 3 mm nonobstructing stone in the upper pole of his left kidney.  Patient reports urinary frequency; he has been drinking a lot of water.  He also reports urgency.  He denies any dysuria or hematuria.  He denies any fever or chills.  Denies any nausea or vomiting.  He states he has a good flow when he urinates.      Current Outpatient Medications   Medication Instructions    amLODIPine (NORVASC) 10 mg, Oral, Daily    aspirin (ECOTRIN) 81 mg, Oral, Daily    blood sugar diagnostic (ONETOUCH ULTRA TEST) Strp To test blood glucose once daily. Dx: code: E11.9 (Patient is NON INSULIN DEPENDENT)    blood-glucose meter kit To check BG 2  times daily, to use with insurance preferred meter    cholecalciferol (vitamin D3) (VITAMIN D3) 25 mcg, Oral, Daily    citalopram (CELEXA) 40 mg, Oral, Daily    isosorbide mononitrate (IMDUR) 30 mg, Oral, Daily    ketorolac (TORADOL) 10 mg, Oral, Every 6 hours    meloxicam (MOBIC) 7.5 mg, Oral, Daily    metFORMIN (GLUCOPHAGE) 1,000 mg, Oral, 2 times daily with meals    nitroGLYCERIN (NITROSTAT) 0.4 mg, Sublingual, Every 5 min PRN    ONETOUCH DELICA PLUS LANCET 33 gauge Misc To test blood glucose once daily. Dx Code: E11.9 (Patient is NON INSULIN DEPENDENT)    ONETOUCH ULTRA2 METER Misc USE TO TEST BLOOD SUGAR 4 TIMES DAILY    pyridoxine (vitamin B6) (VITAMIN B-6) 50 mg, Oral, Daily    rosuvastatin (CRESTOR) 20 mg, Oral, Nightly    tamsulosin (FLOMAX) 0.4 mg, Oral, Daily         ROS:    See HPI.      PE:    ..Visit Vitals  /73   Pulse 71   Temp 98 °F (36.7 °C)   Ht 5' 11" (1.803 m)   Wt 93.9 kg (207 lb)   SpO2 97%   BMI 28.87 kg/m²        General:  He is a well-developed well-nourished obese white male in no apparent distress.  He is alert and " oriented.  She transfers slowly.  He has a normal gait.    Low back:  No CVA tenderness noted at this time.  No midline or paraspinal muscle tenderness.        1. Chronic left flank pain  Overview:  08/22/2024:   Patient has chronic left flank pain.  He also has some right flank pain.    This may be musculoskeletal in origin; can not rule out renal clock.    Patient encouraged to continue to drink lot of fluids.    Start tamsulosin 0.4 mg daily.    Start Toradol 10 mg: Take may take 1 tablet every 6 hours for pain.  Do not take meloxicam with this.  Patient advised to not take this medication for more than 5 consecutive days.  Potential risks and side effects discussed with patient.  ER precautions given: Increasing pain, fever/chills, nausea/vomiting, hematuria or problems with voiding.      2. Left nephrolithiasis  Overview:  08/23/2024:   CT scan in June revealed a nonobstructing 3 mm stone in the upper pole of his left kidney.  If patient develops urinary symptoms or his pain worsens, we will do another CT scan.    ER precautions given.      Other orders  -     tamsulosin (FLOMAX) 0.4 mg Cap; Take 1 capsule (0.4 mg total) by mouth once daily.  Dispense: 30 capsule; Refill: 1  -     ketorolac (TORADOL) 10 mg tablet; Take 1 tablet (10 mg total) by mouth every 6 (six) hours. for 5 days  Dispense: 20 tablet; Refill: 0              ..No follow-ups on file.       Future Appointments   Date Time Provider Department Center   9/13/2024  8:00 AM Demetrio Miller MD [facility] [place]    4/21/2025  8:30 AM Katie Connell FNP-C [facility] [address] [place]

## 2024-08-23 ENCOUNTER — OFFICE VISIT (OUTPATIENT)
Dept: PRIMARY CARE CLINIC | Facility: CLINIC | Age: 69
End: 2024-08-23
Payer: MEDICARE

## 2024-08-23 VITALS
BODY MASS INDEX: 28.98 KG/M2 | OXYGEN SATURATION: 97 % | HEART RATE: 71 BPM | DIASTOLIC BLOOD PRESSURE: 73 MMHG | SYSTOLIC BLOOD PRESSURE: 121 MMHG | HEIGHT: 71 IN | WEIGHT: 207 LBS | TEMPERATURE: 98 F

## 2024-08-23 DIAGNOSIS — G89.29 CHRONIC LEFT FLANK PAIN: Primary | ICD-10-CM

## 2024-08-23 DIAGNOSIS — N20.0 LEFT NEPHROLITHIASIS: ICD-10-CM

## 2024-08-23 DIAGNOSIS — R10.9 CHRONIC LEFT FLANK PAIN: Primary | ICD-10-CM

## 2024-08-23 RX ORDER — KETOROLAC TROMETHAMINE 10 MG/1
10 TABLET, FILM COATED ORAL EVERY 6 HOURS
Qty: 20 TABLET | Refills: 0 | Status: SHIPPED | OUTPATIENT
Start: 2024-08-23 | End: 2024-08-28

## 2024-08-23 RX ORDER — TAMSULOSIN HYDROCHLORIDE 0.4 MG/1
0.4 CAPSULE ORAL DAILY
Qty: 30 CAPSULE | Refills: 1 | Status: SHIPPED | OUTPATIENT
Start: 2024-08-23 | End: 2024-10-22

## 2024-08-28 DIAGNOSIS — Z00.00 MEDICARE ANNUAL WELLNESS VISIT, SUBSEQUENT: Primary | ICD-10-CM

## 2024-08-28 DIAGNOSIS — Z12.5 SCREENING FOR PROSTATE CANCER: ICD-10-CM

## 2024-08-28 DIAGNOSIS — E78.00 HYPERCHOLESTEREMIA: ICD-10-CM

## 2024-08-28 DIAGNOSIS — E11.42 TYPE 2 DIABETES MELLITUS WITH DIABETIC POLYNEUROPATHY, WITHOUT LONG-TERM CURRENT USE OF INSULIN: ICD-10-CM

## 2024-08-28 DIAGNOSIS — R53.83 FATIGUE, UNSPECIFIED TYPE: ICD-10-CM

## 2024-08-28 DIAGNOSIS — I10 ESSENTIAL (PRIMARY) HYPERTENSION: ICD-10-CM

## 2024-09-09 PROBLEM — E66.3 OVERWEIGHT WITH BODY MASS INDEX (BMI) OF 28 TO 28.9 IN ADULT: Status: ACTIVE | Noted: 2024-09-09

## 2024-09-09 NOTE — PROGRESS NOTES
..Annual Exam (Still having kidney stone issues)       HPI:     Patient presents for wellness examination.    He has been feeling well.    He is still having some left sided discomfort. He has not noticed any hematuria.  He has not been sleeping well secondary to left sided pain. He is compliant with his C-PAP.   His appetite is good; he has been compliant with diet.  He works 3 days a week; .    He does not smoke.  He does not drink alcohol.  He drinks about half a pot of coffee a day. He has cut back on his coffee with his stone.  He has 1 soda a week.  Sleep: Dr Vazquez; 2024.  Colonoscopy 10/17/2021; follow up in 10 years.  Cardio: Dr Hidalgo; due for check up.   Calcium score 10/11/23:1085.    Pt dropped a piece of plywood on his right great toe yesterday. It is sore. He can weight bear without discomfort.      The patient's Health Maintenance was reviewed and the following appears to be due at this time:   Health Maintenance Due   Topic Date Due    Hepatitis C Screening  Never done    RSV Vaccine (Age 60+ and Pregnant patients) (1 - 1-dose 60+ series) Never done    COVID-19 Vaccine (3 - 2023-24 season) 2024    Hemoglobin A1c  2024    Diabetes Urine Screening  2024    Lipid Panel  2024       ..  Past Medical History:   Diagnosis Date    Environmental allergies     Essential (primary) hypertension     Fatigue     Glaucoma     Hypercholesteremia     Left nephrolithiasis 2024    Neck pain     Vitamin D deficiency           ..  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 Dx: code: E11.9 (Patient is NON INSULIN DEPENDENT)    blood-glucose meter kit To check BG 2  times daily, to use with insurance preferred meter    cholecalciferol (vitamin D3) (VITAMIN D3) 25 mcg, Oral, Daily    citalopram (CELEXA) 40 mg, Oral, Daily    isosorbide mononitrate (IMDUR) 30 mg, Oral, Daily    metFORMIN (GLUCOPHAGE) 1,000 mg, Oral, 2 times daily with meals    nitroGLYCERIN (NITROSTAT) 0.4 mg, Sublingual, Every 5 min PRN    ONETOUCH DELICA PLUS LANCET 33 gauge Misc To test blood glucose once daily. Dx Code: E11.9 (Patient is NON INSULIN DEPENDENT)    ONETOUCH ULTRA2 METER Misc USE TO TEST BLOOD SUGAR 4 TIMES DAILY    pyridoxine (vitamin B6) (VITAMIN B-6) 50 mg, Oral, Daily    rosuvastatin (CRESTOR) 20 mg, Oral, Nightly    tamsulosin (FLOMAX) 0.4 mg, Oral, Daily         ..  Social History     Socioeconomic History    Marital status:     Number of children: 1   Occupational History    Occupation: yard work   Tobacco Use    Smoking status: Never    Smokeless tobacco: Never   Substance and Sexual Activity    Alcohol use: Never    Drug use: Never     Types: Marijuana    Sexual activity: Never     Social Determinants of Health     Financial Resource Strain: Low Risk  (9/13/2024)    Overall Financial Resource Strain (CARDIA)     Difficulty of Paying Living Expenses: Not hard at all   Food Insecurity: No Food Insecurity (9/13/2024)    Hunger Vital Sign     Worried About Running Out of Food in the Last Year: Never true     Ran Out of Food in the Last Year: Never true   Transportation Needs: No Transportation Needs (9/13/2024)    TRANSPORTATION NEEDS     Transportation : No   Physical Activity: Sufficiently Active  (9/13/2024)    Exercise Vital Sign     Days of Exercise per Week: 3 days     Minutes of Exercise per Session: 90 min   Stress: No Stress Concern Present (9/13/2024)    [employer] - Occupational Stress Questionnaire     Feeling of Stress : Not at all   Housing Stability: Unknown (6/6/2024)    Housing Stability Vital Sign     Unable to Pay for Housing in the Last Year: No          ..  Family History   Problem Relation Name Age of Onset    Dementia Mother Tania ferrarit     Diabetes Mother Tania ferrarit     Arthritis Mother Tania knottgent     Blindness Father Magdi ferrarit sr     Deafness Father Magdi kaplan sr     Stroke Father Magdi ferrarit sr     Alcohol abuse Brother Magdi kaplan jr           ..  Review of patient's allergies indicates:   Allergen Reactions    Penicillin Swelling          ..  Immunization History   Administered Date(s) Administered    COVID-19, MRNA, LN-S, PF (Pfizer) (Purple Cap) 03/08/2021, 03/29/2021    Influenza - High Dose - PF (65 years and older) 09/13/2024    Influenza - Quadrivalent - High Dose - PF (65 years and older) 11/28/2022    Influenza - Trivalent - PF (ADULT) 01/28/2022    Pneumococcal Conjugate - 13 Valent 07/17/2015    Pneumococcal Polysaccharide - 23 Valent 08/26/2022    Tdap 06/05/2009, 08/16/2019    Zoster 08/08/2017    Zoster Recombinant 08/20/2020, 11/30/2020          REVIEW OF SYSTEMS:    GENERAL: No weight loss, no weight gain, no fever, no fatigue, no chills, occasional night sweats  HEENT: No sore throat, no ear pain, no sinus pressure, no nasal congestion, no rhinorrhea, no decreased hearing, no snoring  VISION: No vision changes, no blurry vision, no double vision, no glaucoma, + cataracts, + glasses  LAST EYE EXAM:  06/03/2024; Dr Star Gimenez  NECK: no LAD  CARDIAC: No chest pain, no palpitations, no dyspnea on exertion, no orthopnea  RESPIRATORY: No cough, no wheezing, no sputum production, no SOB  GI: No abdominal pain, occasional nausea  "secondary to kidney stone, no vomiting,  no constipation, no diarrhea, no blood in stool, (-) family history of Colon Ca  : occasional dysuria, no hematuria, no frequency, decreased flow, no urgency, no incontinence, no testicular pain/swelling, (+ ) family history of Prostate Ca: father  MUSC/SKEL: No myalgia, no weakness, no edema, + arthralgia: knees, back pain, hands, no joint swelling/effusions  SKIN: No rashes, no hives, no itching, no sores  NEURO: No HA, no numbness, no tingling, no weakness, no dizziness  PSYCH: No anxiety, no depression, no irritability, no panic attacks, no s/i, no h/i, no hallucinations  ENDO: No polyuria, no polyphagia, no polydipsia  HEME: No bruising, no bleeding disorders, no signs of anemia.       PHYSICAL EXAM:    ..Visit Vitals  /79   Pulse 73   Temp 97.6 °F (36.4 °C)   Ht 5' 11" (1.803 m)   Wt 91.2 kg (201 lb)   SpO2 97%   BMI 28.03 kg/m²        General: Well developed, well nourished white male in no apparent distress, alert and oriented x3  Skin: No rash or abnormal lesions  HEENT: Normocephalic, PERRLA, EOMI, mouth WNL, throat WNL, nares normal, EAC and TM WNL bilaterally  Neck: FROM, no LAD, no thyroid abnormalities palpable  Chest: CTA bilaterally, no wheezes crackles or rubs  Cardiac: RRR, no murmurs, rubs, gallops.  Back:  No CVA tenderness noted.  No midline or paraspinal tenderness noted.  He has increased discomfort with changing positions.  Abdomen: Soft, nontender, nondistended, NBSx4, no rebound tenderness or guarding, no HSM  Extremities: No clubbing, cyanosis, or edema. Joints WNL, +2 DP/PT pulses bilaterally  Neuro: No sensory or motor defects noted. CN II-XII intact. Gait WNL.  Genital: normal testes, no hernias  Rectal:  Deferred  Protective Sensation (w/ 10 gram monofilament):  Right: Decreased  Left: Decreased    Visual Inspection:  Normal -  Bilateral    Pedal Pulses:   Right: Present  Left: Present    Posterior Tibialis Pulses:   Right:Present  Left: " Present       1. Medicare annual wellness visit, subsequent  Overview:  Patient presents for wellness examination.    He has been feeling well.    He has been following a better diet and losing weight.  Colonoscopy 10/17/2021; obtain report.    Cardio:  Dr. Hidalgo; last appointment was about 1 year ago.  He will be seeing him soon.  Sleep:  Dr. Vazquez.  Patient will return for labs; order placed in chart.    09/13/2024:  Patient has been feeling well; however he continues to experience left flank pain.    Patient with nephrolithiasis; repeat CT scan.  Patient encouraged to continue to make healthy food choices and limit portions.  Patient encouraged to continue to lose weight.    Patient encouraged to remain physically active.    Foot exam performed today.  Last eye exam 06/03/2024: Dr. Star Gimenez; report in chart.    Last colonoscopy 07/17/2021; follow-up in 10 years.  Cardio: Dr Hidalgo; due for checkup.    Sleep: Dr. Vazquez.   High-dose flu administered today.    Patient advised to get an RSV vaccine.    Labs pending.      2. Type 2 diabetes mellitus with diabetic polyneuropathy, without long-term current use of insulin  Overview:  Labs 11/28/2022:   A1c 6.1.  Microalbumin 30 milligrams/liter.  Foot exam performed today.    Last eye exam: Sees some one at Dr Manzano's office; 2022.     09/08/2023:  Continue ADA diet; limit intake of sugary foods and refined carbohydrates.    Patient congratulated and encouraged to continue to lose weight.    Last eye exam:  05/29/2023 with Dr. Gimenez at Dr Manzano's office.  Foot exam performed today.  A1c, microalbumin and lipid profile pending.    03/2024: Continue ADA diet; limit intake of sugary foods and refined carbohydrates.    Last eye exam:  05/29/2023 with Dr. Gimenez at Dr Manzano's office.   Foot exam performed today; bilateral decreased sensation.  Proper foot care discussed with patient.  A1c pending.      Orders:  -     metFORMIN (GLUCOPHAGE) 1000 MG tablet; Take 1 tablet  (1,000 mg total) by mouth 2 (two) times daily with meals.  Dispense: 180 tablet; Refill: 3    3. Hypercholesteremia  Overview:  Labs 08/2022: Total cholesterol 188, HDL 49, triglycerides 155 and .    09/08/2023:  Continue low-cholesterol/low-fat diet.    Patient encouraged to continue to lose weight.  Continue rosuvastatin; refills sent.    Lipid profile pending.    09/13/2024:  09/08/2023:  Continue low-cholesterol/low-fat diet.    Patient encouraged to continue to lose weight.  Continue rosuvastatin; refills sent.    Lipid profile pending.    Orders:  -     rosuvastatin (CRESTOR) 20 MG tablet; Take 1 tablet (20 mg total) by mouth every evening.  Dispense: 90 tablet; Refill: 3    4. Essential (primary) hypertension  Overview:  His blood pressure is well controlled; continue current medication.    Limit sodium consumption.    Patient congratulated and encouraged to continue to lose weight.    03/08/2024:  His blood pressure is well controlled; continue current medication.    09/13/2024:  His blood pressure is well controlled; continue amlodipine.  Limit sodium consumption.  Patient encouraged to continue to lose weight.      5. Bilateral carotid artery stenosis  Overview:  Followed by Dr Hidalgo; last office visit 11/2022.    03/2024: Followed by Dr. Hidalgo.    Patient advised to resume baby aspirin daily.    09/13/2024: Followed by Dr. Hidalgo.    Patient advised to resume baby aspirin daily.      6. Atherosclerosis of aorta  Overview:  Stable; followed by CardiologyDr Hidalgo.    03/8/2024:  Stable; followed by CardiologyDr Hidalgo.  Patient advised to resume baby aspirin daily.    09/13/2024: Stable; followed by CardiologyDr Hidalgo.  Patient advised to resume baby aspirin daily.        7. Vitamin D deficiency  Overview:  Labs 08/2022:  Vitamin-D level 38.0    09/08/2023:  Patient has been taking vitamin-D 1000 units daily; vitamin-D level pending.    09/13/2024: Vitamin-D level pending.    Orders:  -     Vitamin D;  Future; Expected date: 09/13/2024    8. Chronic left flank pain  Overview:  08/22/2024:   Patient has chronic left flank pain.  He also has some right flank pain.    This may be musculoskeletal in origin; can not rule out renal colic.   Patient encouraged to continue to drink lot of fluids.    Start tamsulosin 0.4 mg daily.    Start Toradol 10 mg: Take may take 1 tablet every 6 hours for pain.  Do not take meloxicam with this.  Patient advised to not take this medication for more than 5 consecutive days.  Potential risks and side effects discussed with patient.  ER precautions given: Increasing pain, fever/chills, nausea/vomiting, hematuria or problems with voiding.    09/13/2024: Patient continues to have left flank discomfort.  CT in June revealed an 3 mm stone in upper pole of left kidney.  Patient states his pain feels like when he had previous kidney stones.  It does not feel like his back pain.  He does not have any fever or chills.  His pain is worse with movement.  He has occasional dysuria and decreased stream.  Repeat CT for further evaluation due to persistence of flank pain.      9. Chronic midline low back pain with left-sided sciatica  Overview:  Patient seen 02/17/2023.    X-rays of lumbar spine reveal moderate degenerative disc disease at T12-L1 through L2-L3.    02/27/2023:   Patient states his back pain and sciatica is improving; his discomfort is now at 3/10.    09/08/2023:  Patient continues to have low back pain; continue meloxicam; refills sent.  Potential side effects reviewed with patient.    Patient advised to take omeprazole or Pepcid for GI protection.    09/13/2024:  Patient continues to have low back pain; continue meloxicam; refills sent.  Potential side effects reviewed with patient.    Patient advised to take omeprazole or Pepcid for GI protection.        10. Left lower quadrant abdominal pain  Overview:  Patient's low back pain radiates to his lower abdomen and groin.  CT of abdomen  pelvis pending.    Orders:  -     CT Abdomen Pelvis  Without Contrast; Future; Expected date: 09/13/2024    11. Primary osteoarthritis of both knees  Overview:  Stable; continue meloxicam daily.  Refills sent.    GI protection with omeprazole or Pepcid daily.    09/13/2024:  Stable; continue meloxicam daily.  Refills sent.  Take Pepcid or omeprazole over-the-counter for GI protection.        12. Obstructive sleep apnea  Overview:  Stable; followed by Dr. Vazquez.  Patient will be getting a new machine on Friday.    09/08/2023:  Patient continues to be compliant with and doing well CPAP.    Followed by Dr. Vazquez.    03/2024: Patient is compliant with and doing well on CPAP therapy.    09/13/2024: Patient continues to be compliant with and doing well CPAP.    Followed by Dr. Vazquez.      13. Environmental allergies  Overview:  Patient continues to have allergies and sinus issues.    Patient takes over-the-counter medication as needed.    9/13/2024: Patient states his allergies has been doing well recently; continue over-the-counter medication as needed.      14. Screening for prostate cancer  Overview:  Patient reports nocturia but otherwise denies obstructive or irritative voiding symptoms.    His father had prostate cancer.    His prostate exam is benign.    PSA pending.    09/13/2024: Patient reports occasional dysuria and decreased stream; he denies any other obstructive or irritative symptoms.    His father had prostate.    PSA pending.      15. Overweight with body mass index (BMI) of 28 to 28.9 in adult  Overview:  09/28/2024:   Patient encouraged to make healthy food choices and limit portions.    Patient encouraged to limit intake of fried foods, processed foods and sugary foods.    Patient encouraged to lose weight.       16. Immunization due  Assessment & Plan:  09/13/2024:  High-dose flu 0.5 IM administered; benefits and risks reviewed patient.    Patient advised to get an RSV vaccine; benefits and  risks reviewed.    Orders:  -     Discontinue: influenza (High-Dose) (Fluzone High-Dose) 180 mcg/0.5 mL IM vaccine (> or = 66 yo) 0.5 mL  -     influenza (High-Dose) (Fluzone High-Dose) 180 mcg/0.5 mL IM vaccine (> or = 66 yo) 0.5 mL    Other orders  -     amLODIPine (NORVASC) 10 MG tablet; Take 1 tablet (10 mg total) by mouth once daily.  Dispense: 90 tablet; Refill: 3  -     Discontinue: metFORMIN (GLUCOPHAGE) 1000 MG tablet; Take 1 tablet (1,000 mg total) by mouth 2 (two) times daily with meals.  Dispense: 180 tablet; Refill: 3         ..Follow up in about 6 months (around 3/13/2025) for Follow Up.     Future Appointments   Date Time Provider Department Center   3/14/2025  8:00 AM Demetrio Miller MD [facility]   4/21/2025  8:30 AM Katie Connell FNP-C [address] [place]   9/19/2025  8:00 AM Demetrio Miller MD [facility]

## 2024-09-12 ENCOUNTER — TELEPHONE (OUTPATIENT)
Dept: PRIMARY CARE CLINIC | Facility: CLINIC | Age: 69
End: 2024-09-12
Payer: MEDICARE

## 2024-09-12 NOTE — TELEPHONE ENCOUNTER
----- Message from Cindy Rahman LPN sent at 9/10/2024 10:23 AM CDT -----    ----- Message -----  From: Davi Page  Sent: 9/10/2024   9:00 AM CDT  To: [facility] Staff    .Type:  Needs Medical Advice    Who Called: pt   Symptoms (please be specific): kidney stone    How long has patient had these symptoms:  7/14/24  Pharmacy name and phone #:    Would the patient rather a call back or a response via [facility]? Call back   Best Call Back Number: [phone]  Additional Information: called the back line no answer

## 2024-09-13 ENCOUNTER — OFFICE VISIT (OUTPATIENT)
Dept: PRIMARY CARE CLINIC | Facility: CLINIC | Age: 69
End: 2024-09-13
Payer: MEDICARE

## 2024-09-13 ENCOUNTER — LAB VISIT (OUTPATIENT)
Dept: LAB | Facility: HOSPITAL | Age: 69
End: 2024-09-13
Attending: FAMILY MEDICINE
Payer: MEDICARE

## 2024-09-13 VITALS
OXYGEN SATURATION: 97 % | BODY MASS INDEX: 28.14 KG/M2 | DIASTOLIC BLOOD PRESSURE: 79 MMHG | HEIGHT: 71 IN | HEART RATE: 73 BPM | SYSTOLIC BLOOD PRESSURE: 118 MMHG | WEIGHT: 201 LBS | TEMPERATURE: 98 F

## 2024-09-13 DIAGNOSIS — M17.0 PRIMARY OSTEOARTHRITIS OF BOTH KNEES: ICD-10-CM

## 2024-09-13 DIAGNOSIS — G89.29 CHRONIC MIDLINE LOW BACK PAIN WITH LEFT-SIDED SCIATICA: ICD-10-CM

## 2024-09-13 DIAGNOSIS — Z91.09 ENVIRONMENTAL ALLERGIES: ICD-10-CM

## 2024-09-13 DIAGNOSIS — E78.00 HYPERCHOLESTEREMIA: ICD-10-CM

## 2024-09-13 DIAGNOSIS — Z12.5 SCREENING FOR PROSTATE CANCER: ICD-10-CM

## 2024-09-13 DIAGNOSIS — R10.9 CHRONIC LEFT FLANK PAIN: ICD-10-CM

## 2024-09-13 DIAGNOSIS — Z23 IMMUNIZATION DUE: ICD-10-CM

## 2024-09-13 DIAGNOSIS — G47.33 OBSTRUCTIVE SLEEP APNEA: ICD-10-CM

## 2024-09-13 DIAGNOSIS — I10 ESSENTIAL (PRIMARY) HYPERTENSION: ICD-10-CM

## 2024-09-13 DIAGNOSIS — E11.42 TYPE 2 DIABETES MELLITUS WITH DIABETIC POLYNEUROPATHY, WITHOUT LONG-TERM CURRENT USE OF INSULIN: ICD-10-CM

## 2024-09-13 DIAGNOSIS — Z00.00 MEDICARE ANNUAL WELLNESS VISIT, SUBSEQUENT: Primary | ICD-10-CM

## 2024-09-13 DIAGNOSIS — G89.29 CHRONIC LEFT FLANK PAIN: ICD-10-CM

## 2024-09-13 DIAGNOSIS — E55.9 VITAMIN D DEFICIENCY: ICD-10-CM

## 2024-09-13 DIAGNOSIS — I65.23 BILATERAL CAROTID ARTERY STENOSIS: ICD-10-CM

## 2024-09-13 DIAGNOSIS — R53.83 FATIGUE, UNSPECIFIED TYPE: ICD-10-CM

## 2024-09-13 DIAGNOSIS — I70.0 ATHEROSCLEROSIS OF AORTA: ICD-10-CM

## 2024-09-13 DIAGNOSIS — Z00.00 MEDICARE ANNUAL WELLNESS VISIT, SUBSEQUENT: ICD-10-CM

## 2024-09-13 DIAGNOSIS — R10.32 LEFT LOWER QUADRANT ABDOMINAL PAIN: ICD-10-CM

## 2024-09-13 DIAGNOSIS — M54.42 CHRONIC MIDLINE LOW BACK PAIN WITH LEFT-SIDED SCIATICA: ICD-10-CM

## 2024-09-13 DIAGNOSIS — E66.3 OVERWEIGHT WITH BODY MASS INDEX (BMI) OF 28 TO 28.9 IN ADULT: ICD-10-CM

## 2024-09-13 PROBLEM — E11.9 TYPE 2 DIABETES MELLITUS WITHOUT COMPLICATION, WITHOUT LONG-TERM CURRENT USE OF INSULIN: Status: RESOLVED | Noted: 2024-09-13 | Resolved: 2024-09-13

## 2024-09-13 PROBLEM — E11.9 TYPE 2 DIABETES MELLITUS WITHOUT COMPLICATION, WITHOUT LONG-TERM CURRENT USE OF INSULIN: Status: ACTIVE | Noted: 2024-09-13

## 2024-09-13 LAB
25(OH)D3+25(OH)D2 SERPL-MCNC: 65 NG/ML (ref 30–80)
ALBUMIN SERPL-MCNC: 4 G/DL (ref 3.4–4.8)
ALBUMIN/GLOB SERPL: 1.5 RATIO (ref 1.1–2)
ALP SERPL-CCNC: 50 UNIT/L (ref 40–150)
ALT SERPL-CCNC: 18 UNIT/L (ref 0–55)
ANION GAP SERPL CALC-SCNC: 8 MEQ/L
AST SERPL-CCNC: 13 UNIT/L (ref 5–34)
BACTERIA #/AREA URNS AUTO: ABNORMAL /HPF
BASOPHILS # BLD AUTO: 0.04 X10(3)/MCL
BASOPHILS NFR BLD AUTO: 0.4 %
BILIRUB SERPL-MCNC: 0.7 MG/DL
BILIRUB UR QL STRIP.AUTO: NEGATIVE
BUN SERPL-MCNC: 14.8 MG/DL (ref 8.4–25.7)
CALCIUM SERPL-MCNC: 10.6 MG/DL (ref 8.8–10)
CHLORIDE SERPL-SCNC: 106 MMOL/L (ref 98–107)
CHOLEST SERPL-MCNC: 119 MG/DL
CHOLEST/HDLC SERPL: 3 {RATIO} (ref 0–5)
CLARITY UR: CLEAR
CO2 SERPL-SCNC: 27 MMOL/L (ref 23–31)
COLOR UR AUTO: YELLOW
CREAT SERPL-MCNC: 0.91 MG/DL (ref 0.73–1.18)
CREAT UR-MCNC: 85.7 MG/DL (ref 63–166)
CREAT/UREA NIT SERPL: 16
EOSINOPHIL # BLD AUTO: 0.14 X10(3)/MCL (ref 0–0.9)
EOSINOPHIL NFR BLD AUTO: 1.4 %
ERYTHROCYTE [DISTWIDTH] IN BLOOD BY AUTOMATED COUNT: 13 % (ref 11.5–17)
EST. AVERAGE GLUCOSE BLD GHB EST-MCNC: 114 MG/DL
GFR SERPLBLD CREATININE-BSD FMLA CKD-EPI: >60 ML/MIN/1.73/M2
GLOBULIN SER-MCNC: 2.7 GM/DL (ref 2.4–3.5)
GLUCOSE SERPL-MCNC: 90 MG/DL (ref 82–115)
GLUCOSE UR QL STRIP: NORMAL
HBA1C MFR BLD: 5.6 %
HCT VFR BLD AUTO: 46.6 % (ref 42–52)
HDLC SERPL-MCNC: 44 MG/DL (ref 35–60)
HGB BLD-MCNC: 15.9 G/DL (ref 14–18)
HGB UR QL STRIP: ABNORMAL
IMM GRANULOCYTES # BLD AUTO: 0.04 X10(3)/MCL (ref 0–0.04)
IMM GRANULOCYTES NFR BLD AUTO: 0.4 %
KETONES UR QL STRIP: NEGATIVE
LDLC SERPL CALC-MCNC: 59 MG/DL (ref 50–140)
LEUKOCYTE ESTERASE UR QL STRIP: NEGATIVE
LYMPHOCYTES # BLD AUTO: 2.05 X10(3)/MCL (ref 0.6–4.6)
LYMPHOCYTES NFR BLD AUTO: 21 %
MCH RBC QN AUTO: 32.7 PG (ref 27–31)
MCHC RBC AUTO-ENTMCNC: 34.1 G/DL (ref 33–36)
MCV RBC AUTO: 95.9 FL (ref 80–94)
MICROALBUMIN UR-MCNC: 6.1 UG/ML
MICROALBUMIN/CREAT RATIO PNL UR: 7.1 MG/GM CR (ref 0–30)
MONOCYTES # BLD AUTO: 1.17 X10(3)/MCL (ref 0.1–1.3)
MONOCYTES NFR BLD AUTO: 12 %
MUCOUS THREADS URNS QL MICRO: ABNORMAL /LPF
NEUTROPHILS # BLD AUTO: 6.3 X10(3)/MCL (ref 2.1–9.2)
NEUTROPHILS NFR BLD AUTO: 64.8 %
NITRITE UR QL STRIP: NEGATIVE
NRBC BLD AUTO-RTO: 0 %
PH UR STRIP: 6 [PH]
PLATELET # BLD AUTO: 331 X10(3)/MCL (ref 130–400)
PMV BLD AUTO: 9.7 FL (ref 7.4–10.4)
POTASSIUM SERPL-SCNC: 4.4 MMOL/L (ref 3.5–5.1)
PROT SERPL-MCNC: 6.7 GM/DL (ref 5.8–7.6)
PROT UR QL STRIP: NEGATIVE
PSA SERPL-MCNC: 0.49 NG/ML
RBC # BLD AUTO: 4.86 X10(6)/MCL (ref 4.7–6.1)
RBC #/AREA URNS AUTO: ABNORMAL /HPF
SODIUM SERPL-SCNC: 141 MMOL/L (ref 136–145)
SP GR UR STRIP.AUTO: 1.01 (ref 1–1.03)
SQUAMOUS #/AREA URNS LPF: ABNORMAL /HPF
TRIGL SERPL-MCNC: 79 MG/DL (ref 34–140)
TSH SERPL-ACNC: 2.17 UIU/ML (ref 0.35–4.94)
UROBILINOGEN UR STRIP-ACNC: NORMAL
VLDLC SERPL CALC-MCNC: 16 MG/DL
WBC # BLD AUTO: 9.74 X10(3)/MCL (ref 4.5–11.5)
WBC #/AREA URNS AUTO: ABNORMAL /HPF

## 2024-09-13 PROCEDURE — 82043 UR ALBUMIN QUANTITATIVE: CPT

## 2024-09-13 PROCEDURE — 80061 LIPID PANEL: CPT

## 2024-09-13 PROCEDURE — 85025 COMPLETE CBC W/AUTO DIFF WBC: CPT

## 2024-09-13 PROCEDURE — 82306 VITAMIN D 25 HYDROXY: CPT

## 2024-09-13 PROCEDURE — 84153 ASSAY OF PSA TOTAL: CPT

## 2024-09-13 PROCEDURE — 80053 COMPREHEN METABOLIC PANEL: CPT

## 2024-09-13 PROCEDURE — 84443 ASSAY THYROID STIM HORMONE: CPT

## 2024-09-13 PROCEDURE — 81001 URINALYSIS AUTO W/SCOPE: CPT

## 2024-09-13 PROCEDURE — 81015 MICROSCOPIC EXAM OF URINE: CPT

## 2024-09-13 PROCEDURE — 83036 HEMOGLOBIN GLYCOSYLATED A1C: CPT

## 2024-09-13 PROCEDURE — 36415 COLL VENOUS BLD VENIPUNCTURE: CPT

## 2024-09-13 RX ORDER — AMLODIPINE BESYLATE 10 MG/1
10 TABLET ORAL DAILY
Qty: 90 TABLET | Refills: 3 | Status: SHIPPED | OUTPATIENT
Start: 2024-09-13 | End: 2025-09-08

## 2024-09-13 RX ORDER — METFORMIN HYDROCHLORIDE 1000 MG/1
1000 TABLET ORAL 2 TIMES DAILY WITH MEALS
Qty: 180 TABLET | Refills: 3 | Status: SHIPPED | OUTPATIENT
Start: 2024-09-13 | End: 2024-09-13 | Stop reason: SDUPTHER

## 2024-09-13 RX ORDER — METFORMIN HYDROCHLORIDE 1000 MG/1
1000 TABLET ORAL 2 TIMES DAILY WITH MEALS
Qty: 180 TABLET | Refills: 3 | Status: SHIPPED | OUTPATIENT
Start: 2024-09-13 | End: 2025-09-08

## 2024-09-13 RX ORDER — ROSUVASTATIN CALCIUM 20 MG/1
20 TABLET, COATED ORAL NIGHTLY
Qty: 90 TABLET | Refills: 3 | Status: SHIPPED | OUTPATIENT
Start: 2024-09-13 | End: 2025-09-08

## 2024-09-13 NOTE — ASSESSMENT & PLAN NOTE
09/13/2024:  High-dose flu 0.5 IM administered; benefits and risks reviewed patient.    Patient advised to get an RSV vaccine; benefits and risks reviewed.

## 2024-09-16 DIAGNOSIS — N20.0 LEFT NEPHROLITHIASIS: Primary | ICD-10-CM

## 2024-09-16 DIAGNOSIS — R10.9 CHRONIC LEFT FLANK PAIN: ICD-10-CM

## 2024-09-16 DIAGNOSIS — G89.29 CHRONIC LEFT FLANK PAIN: ICD-10-CM

## 2024-09-19 ENCOUNTER — TELEPHONE (OUTPATIENT)
Dept: PRIMARY CARE CLINIC | Facility: CLINIC | Age: 69
End: 2024-09-19
Payer: MEDICARE

## 2024-09-19 NOTE — TELEPHONE ENCOUNTER
Pt called about Urology referral being faxed to [facility]. Informed pt it was faxed

## 2024-10-15 DIAGNOSIS — R35.0 URINARY FREQUENCY: Primary | ICD-10-CM

## 2024-10-15 RX ORDER — TAMSULOSIN HYDROCHLORIDE 0.4 MG/1
0.4 CAPSULE ORAL DAILY
Qty: 30 CAPSULE | Refills: 0 | Status: SHIPPED | OUTPATIENT
Start: 2024-10-15 | End: 2024-12-14

## 2025-03-09 NOTE — PROGRESS NOTES
"Follow-up (Lower back pain and kidney x 2 months)       HPI:    Patient presents for 6 month recheck.  He has been feeling well except for back or flank pain.  He wakes up 3-4 times at night to urinate.  His appetite has been too good; he limits his sugars and starches.  He denies any recent illnesses.   He drinks a lot of water and urinates a lot.        Current Outpatient Medications   Medication Instructions    amLODIPine (NORVASC) 10 mg, Oral, Daily    aspirin (ECOTRIN) 81 mg, Oral, Daily    blood sugar diagnostic (ONETOUCH ULTRA TEST) Strp To test blood glucose once daily. Dx: code: E11.9 (Patient is NON INSULIN DEPENDENT)    blood-glucose meter kit To check BG 2  times daily, to use with insurance preferred meter    cholecalciferol (vitamin D3) (VITAMIN D3) 25 mcg, Daily    citalopram (CELEXA) 40 mg, Oral, Daily    isosorbide mononitrate (IMDUR) 30 mg, Daily    meloxicam (MOBIC) 7.5 mg, Oral, Daily    metFORMIN (GLUCOPHAGE) 1,000 mg, Oral, 2 times daily with meals    nitroGLYCERIN (NITROSTAT) 0.4 mg, Every 5 min PRN    ONETOUCH DELICA PLUS LANCET 33 gauge Misc To test blood glucose once daily. Dx Code: E11.9 (Patient is NON INSULIN DEPENDENT)    ONETOUCH ULTRA2 METER Misc USE TO TEST BLOOD SUGAR 4 TIMES DAILY    pyridoxine (vitamin B6) (VITAMIN B-6) 50 mg, Daily    rosuvastatin (CRESTOR) 20 mg, Oral, Nightly    tamsulosin (FLOMAX) 0.4 mg, Oral, Daily    traMADoL (ULTRAM) 50 mg, Oral, Every 8 hours PRN         ROS:    He is still having left flank pain and midline low back pain. Continuous. When he goes to stand up, he has a lot of discomfort. Worse with prolonged standing. He can walk without discomfort. He denies radicular leg pain.     Patient still works 3 days a week.    He bowls once a week.      PE:    ..Visit Vitals  /82   Pulse 70   Temp 97.7 °F (36.5 °C)   Ht 5' 11" (1.803 m)   Wt 93.4 kg (205 lb 12.8 oz)   SpO2 96%   BMI 28.70 kg/m²        General: He is well-developed well-nourished obese " white male in no apparent distress.  He is alert and oriented.  Chest: Clear to auscultation bilaterally.    CV: Regular rate rhythm without murmurs rubs gallops.  Abdomen: Soft, nontender, no masses.    Low back: Normal in appearance.  No midline, paraspinal or sacroiliac joint tenderness.  Bilateral lower extremities without edema.  Protective Sensation (w/ 10 gram monofilament):  Right: Decreased  Left: Decreased    Visual Inspection:  Normal -  Bilateral    Pedal Pulses:   Right: Present  Left: Present    Posterior Tibialis Pulses:   Right:Present  Left: Present         1. Type 2 diabetes mellitus with diabetic polyneuropathy, without long-term current use of insulin  Overview:  Labs 11/28/2022:   A1c 6.1.  Microalbumin 30 milligrams/liter.  Foot exam performed today.    Last eye exam: Sees some one at Dr Manzano's office; 2022.     09/08/2023:  Continue ADA diet; limit intake of sugary foods and refined carbohydrates.    Patient congratulated and encouraged to continue to lose weight.    Last eye exam:  05/29/2023 with Dr. Gimenez at Dr Manzano's office.  Foot exam performed today.  A1c, microalbumin and lipid profile pending.    03/2024: Continue ADA diet; limit intake of sugary foods and refined carbohydrates.    Last eye exam:  05/29/2023 with Dr. Gimenez at Dr Manzano's office.   Foot exam performed today; bilateral decreased sensation.  Proper foot care discussed with patient.  A1c pending.      Assessment & Plan:  Continue ADA diet.  Foot exam performed today.    A1c pending.    Orders:  -     Hemoglobin A1C; Future; Expected date: 03/14/2025    2. Essential (primary) hypertension  Overview:  His blood pressure is well controlled; continue current medication.    Limit sodium consumption.    Patient congratulated and encouraged to continue to lose weight.    03/08/2024:  His blood pressure is well controlled; continue current medication.    09/13/2024:  His blood pressure is well controlled; continue  amlodipine.  Limit sodium consumption.  Patient encouraged to continue to lose weight.    Assessment & Plan:  Well controlled; continue current medication.      3. Chronic midline low back pain with left-sided sciatica  Overview:  Patient seen 02/17/2023.    X-rays of lumbar spine reveal moderate degenerative disc disease at T12-L1 through L2-L3.    02/27/2023:   Patient states his back pain and sciatica is improving; his discomfort is now at 3/10.    09/08/2023:  Patient continues to have low back pain; continue meloxicam; refills sent.  Potential side effects reviewed with patient.    Patient advised to take omeprazole or Pepcid for GI protection.    09/13/2024:  Patient continues to have low back pain; continue meloxicam; refills sent.  Potential side effects reviewed with patient.    Patient advised to take omeprazole or Pepcid for GI protection.      Assessment & Plan:  Patient continues to have chronic low back pain; continue meloxicam.  Patient reminded to take over-the-counter PPI for GI protection.    Start tramadol 50 mg: Take 1 tablet every 6-8 hours as needed for severe pain.  Potential side effects discussed with patient.    Orders:  -     traMADoL (ULTRAM) 50 mg tablet; Take 1 tablet (50 mg total) by mouth every 8 (eight) hours as needed for Pain.  Dispense: 20 tablet; Refill: 0    4. Overweight with body mass index (BMI) of 28 to 28.9 in adult  Overview:  09/28/2024:   Patient encouraged to make healthy food choices and limit portions.    Patient encouraged to limit intake of fried foods, processed foods and sugary foods.    Patient encouraged to lose weight.       5. Obstructive sleep apnea  Overview:  Stable; followed by Dr. Vazquez.  Patient will be getting a new machine on Friday.    09/08/2023:  Patient continues to be compliant with and doing well CPAP.    Followed by Dr. Vazquez.    03/2024: Patient is compliant with and doing well on CPAP therapy.    09/13/2024: Patient continues to be  compliant with and doing well CPAP.    Followed by Dr. Vazquez.      6. Chronic left flank pain  Overview:  08/22/2024:   Patient has chronic left flank pain.  He also has some right flank pain.    This may be musculoskeletal in origin; can not rule out renal colic.   Patient encouraged to continue to drink lot of fluids.    Start tamsulosin 0.4 mg daily.    Start Toradol 10 mg: Take may take 1 tablet every 6 hours for pain.  Do not take meloxicam with this.  Patient advised to not take this medication for more than 5 consecutive days.  Potential risks and side effects discussed with patient.  ER precautions given: Increasing pain, fever/chills, nausea/vomiting, hematuria or problems with voiding.    09/13/2024: Patient continues to have left flank discomfort.  CT in June revealed an 3 mm stone in upper pole of left kidney.  Patient states his pain feels like when he had previous kidney stones.  It does not feel like his back pain.  He does not have any fever or chills.  His pain is worse with movement.  He has occasional dysuria and decreased stream.  Repeat CT for further evaluation due to persistence of flank pain.    Assessment & Plan:  Patient continues to have left flank pain; no urological cause identified.  Could be related to degenerative disc disease lumbar spine.  Trial of tramadol 50 mg: Take 1 tablet every 6-8 hours as needed for severe pain.  Potential side effects discussed with patient.    Orders:  -     traMADoL (ULTRAM) 50 mg tablet; Take 1 tablet (50 mg total) by mouth every 8 (eight) hours as needed for Pain.  Dispense: 20 tablet; Refill: 0    7. Atherosclerosis of aorta  Overview:  Stable; followed by CardiologyDr Hidalgo.    03/8/2024:  Stable; followed by CardiologyDr Hidalgo.  Patient advised to resume baby aspirin daily.    09/13/2024: Stable; followed by CardiologyDr Hidalgo.  Patient advised to resume baby aspirin daily.      Assessment & Plan:  Stable; followed by CardiologyDr Hidalgo.                  ..Follow up in about 1 year (around 3/14/2026) for Wellness, With labwork prior to visit.       Future Appointments   Date Time Provider Department Center   4/21/2025  8:30 AM Katie Connell FNP-C [facility] [address]   9/19/2025  8:00 AM Demetrio Miller MD [facility] [place]    3/19/2026 10:00 AM Demetrio Miller MD [facility] [place]

## 2025-03-14 ENCOUNTER — RESULTS FOLLOW-UP (OUTPATIENT)
Dept: PRIMARY CARE CLINIC | Facility: CLINIC | Age: 70
End: 2025-03-14

## 2025-03-14 ENCOUNTER — LAB VISIT (OUTPATIENT)
Dept: LAB | Facility: HOSPITAL | Age: 70
End: 2025-03-14
Attending: FAMILY MEDICINE
Payer: MEDICARE

## 2025-03-14 ENCOUNTER — OFFICE VISIT (OUTPATIENT)
Dept: PRIMARY CARE CLINIC | Facility: CLINIC | Age: 70
End: 2025-03-14
Payer: MEDICARE

## 2025-03-14 VITALS
OXYGEN SATURATION: 96 % | DIASTOLIC BLOOD PRESSURE: 82 MMHG | HEART RATE: 70 BPM | HEIGHT: 71 IN | BODY MASS INDEX: 28.81 KG/M2 | SYSTOLIC BLOOD PRESSURE: 116 MMHG | TEMPERATURE: 98 F | WEIGHT: 205.81 LBS

## 2025-03-14 DIAGNOSIS — G47.33 OBSTRUCTIVE SLEEP APNEA: ICD-10-CM

## 2025-03-14 DIAGNOSIS — E11.42 TYPE 2 DIABETES MELLITUS WITH DIABETIC POLYNEUROPATHY, WITHOUT LONG-TERM CURRENT USE OF INSULIN: ICD-10-CM

## 2025-03-14 DIAGNOSIS — E66.3 OVERWEIGHT WITH BODY MASS INDEX (BMI) OF 28 TO 28.9 IN ADULT: ICD-10-CM

## 2025-03-14 DIAGNOSIS — R10.9 CHRONIC LEFT FLANK PAIN: ICD-10-CM

## 2025-03-14 DIAGNOSIS — I10 ESSENTIAL (PRIMARY) HYPERTENSION: ICD-10-CM

## 2025-03-14 DIAGNOSIS — I70.0 ATHEROSCLEROSIS OF AORTA: ICD-10-CM

## 2025-03-14 DIAGNOSIS — G89.29 CHRONIC LEFT FLANK PAIN: ICD-10-CM

## 2025-03-14 DIAGNOSIS — E11.42 TYPE 2 DIABETES MELLITUS WITH DIABETIC POLYNEUROPATHY, WITHOUT LONG-TERM CURRENT USE OF INSULIN: Primary | ICD-10-CM

## 2025-03-14 DIAGNOSIS — G89.29 CHRONIC MIDLINE LOW BACK PAIN WITH LEFT-SIDED SCIATICA: ICD-10-CM

## 2025-03-14 DIAGNOSIS — M54.42 CHRONIC MIDLINE LOW BACK PAIN WITH LEFT-SIDED SCIATICA: ICD-10-CM

## 2025-03-14 LAB
EST. AVERAGE GLUCOSE BLD GHB EST-MCNC: 114 MG/DL
HBA1C MFR BLD: 5.6 %

## 2025-03-14 PROCEDURE — 83036 HEMOGLOBIN GLYCOSYLATED A1C: CPT

## 2025-03-14 PROCEDURE — 36415 COLL VENOUS BLD VENIPUNCTURE: CPT

## 2025-03-14 RX ORDER — TRAMADOL HYDROCHLORIDE 50 MG/1
50 TABLET ORAL EVERY 8 HOURS PRN
Qty: 20 TABLET | Refills: 0 | Status: SHIPPED | OUTPATIENT
Start: 2025-03-14

## 2025-03-14 NOTE — ASSESSMENT & PLAN NOTE
Patient encouraged to make healthy food choices and limit portions.    Patient encouraged to limit intake of fried foods, processed foods and sugary foods.    Patient encouraged to lose weight.

## 2025-03-14 NOTE — ASSESSMENT & PLAN NOTE
Patient continues to have chronic low back pain; continue meloxicam.  Patient reminded to take over-the-counter PPI for GI protection.    Start tramadol 50 mg: Take 1 tablet every 6-8 hours as needed for severe pain.  Potential side effects discussed with patient.

## 2025-03-14 NOTE — ASSESSMENT & PLAN NOTE
Patient continues to have left flank pain; no urological cause identified.  Could be related to degenerative disc disease lumbar spine.  Trial of tramadol 50 mg: Take 1 tablet every 6-8 hours as needed for severe pain.  Potential side effects discussed with patient.

## 2025-03-26 ENCOUNTER — PATIENT OUTREACH (OUTPATIENT)
Facility: CLINIC | Age: 70
End: 2025-03-26
Payer: MEDICARE

## 2025-03-31 ENCOUNTER — PATIENT OUTREACH (OUTPATIENT)
Dept: ADMINISTRATIVE | Facility: HOSPITAL | Age: 70
End: 2025-03-31
Payer: MEDICARE

## 2025-04-14 ENCOUNTER — PATIENT MESSAGE (OUTPATIENT)
Dept: NEUROLOGY | Facility: CLINIC | Age: 70
End: 2025-04-14
Payer: MEDICARE

## 2025-04-21 ENCOUNTER — OFFICE VISIT (OUTPATIENT)
Dept: NEUROLOGY | Facility: CLINIC | Age: 70
End: 2025-04-21
Payer: MEDICARE

## 2025-04-21 VITALS
WEIGHT: 196 LBS | SYSTOLIC BLOOD PRESSURE: 114 MMHG | HEIGHT: 71 IN | DIASTOLIC BLOOD PRESSURE: 66 MMHG | BODY MASS INDEX: 27.44 KG/M2

## 2025-04-21 DIAGNOSIS — G47.00 INSOMNIA, UNSPECIFIED TYPE: ICD-10-CM

## 2025-04-21 DIAGNOSIS — G47.33 OBSTRUCTIVE SLEEP APNEA: Primary | ICD-10-CM

## 2025-04-21 PROCEDURE — 99213 OFFICE O/P EST LOW 20 MIN: CPT | Mod: PBBFAC | Performed by: NURSE PRACTITIONER

## 2025-04-21 PROCEDURE — 99214 OFFICE O/P EST MOD 30 MIN: CPT | Mod: S$PBB,,, | Performed by: NURSE PRACTITIONER

## 2025-04-21 PROCEDURE — 99999 PR PBB SHADOW E&M-EST. PATIENT-LVL III: CPT | Mod: PBBFAC,,, | Performed by: NURSE PRACTITIONER

## 2025-04-21 RX ORDER — DICLOFENAC SODIUM 75 MG/1
75 TABLET, DELAYED RELEASE ORAL 2 TIMES DAILY
COMMUNITY
Start: 2025-04-05

## 2025-04-21 NOTE — PROGRESS NOTES
Established GUY Patient   SUBJECTIVE:    Patient ID: Micah Mendoza , 69 y.o.    Past Medical History:   Diagnosis Date    Environmental allergies     Essential (primary) hypertension     Fatigue     Glaucoma     Hypercholesteremia     Left nephrolithiasis 2024    Neck pain     Vitamin D deficiency        Past Surgical History:   Procedure Laterality Date    ANTERIOR CRUCIATE LIGAMENT REPAIR      Cervical vertebral fusion       SECTION      345    CLOSED REDUCTION OF FRACTURE OF NASAL BONE      Closed [percutaneous] [needle] biopsy of kidney  02/15/2012    COLONOSCOPY  10/17/2011    Excision of accessory spleen  02/15/2012    EXPLORATORY LAPAROTOMY      LAPAROSCOPY  02/15/2012    NASAL SEPTOPLASTY      orchidectomy      Right foot      ROTATOR CUFF REPAIR      Unilateral adrenalectomy   02/15/2012       Review of patient's allergies indicates:   Allergen Reactions    Penicillin Swelling       Chief Complaint: GUY f/u    History of Present Illness:     Here for PAP follow up. Wears CPAP every night. He believes PAP therapy is beneficial. Tolerating pap therapy okay. Sleeps 5-6 hrs a night. Diff with  staying asleep.   No issue with machine (about 9 yrs ago) but the mask leaks if the mask isnt tight around his face. It causing an indention of his nose.  .   [facility] [place] [place]     Did not bring SD card    Review of Systems - as per HPI, otherwise a balanced 10 systems review is negative.    Current Medications:  Current Outpatient Medications   Medication Instructions    amLODIPine (NORVASC) 10 mg, Oral, Daily    aspirin (ECOTRIN) 81 mg, Oral, Daily    blood sugar diagnostic (ONETOUCH ULTRA TEST) Strp To test blood glucose once daily. Dx: code: E11.9 (Patient is NON INSULIN DEPENDENT)    blood-glucose meter kit To check BG 2  times daily, to use with insurance preferred meter    cholecalciferol (vitamin D3) (VITAMIN D3) 25 mcg, Daily    citalopram (CELEXA) 40 mg, Oral, Daily    diclofenac (VOLTAREN) 75 mg, 2 times  "daily    isosorbide mononitrate (IMDUR) 30 mg, Daily    metFORMIN (GLUCOPHAGE) 1,000 mg, Oral, 2 times daily with meals    nitroGLYCERIN (NITROSTAT) 0.4 mg, Every 5 min PRN    ONETOUCH DELICA PLUS LANCET 33 gauge Misc To test blood glucose once daily. Dx Code: E11.9 (Patient is NON INSULIN DEPENDENT)    ONETOUCH ULTRA2 METER Misc USE TO TEST BLOOD SUGAR 4 TIMES DAILY    pyridoxine (vitamin B6) (VITAMIN B-6) 50 mg, Daily    rosuvastatin (CRESTOR) 20 mg, Oral, Nightly    traMADoL (ULTRAM) 50 mg, Oral, Every 8 hours PRN       OBJECTIVE:    Vitals:  /66 (BP Location: Right arm)   Ht 5' 11" (1.803 m)   Wt 88.9 kg (196 lb)   BMI 27.34 kg/m²      Physical Exam:  Constitutional  he appears well-developed and well-nourished. he is well groomed.    Accompanied by - self  Appearance - well appearing, no apparent distress, unassisted  Heart - RRR auscultated without murmur  Skin- no obvious lesions noted    Neurologic  Cortical function - The patient is alert, attentive   Speech - clear   Cranial nerves:  CN 3, 4, 6 EOMs - normal. No ptosis or lateral gaze deviation  CN 7 - no face asymmetry   CN 8 - hearing is grossly normal  Gait - unassisted; posture upright. gait is steady with normal steps    Review of Data:            4/14/2025     8:49 AM   EPWORTH SLEEPINESS SCALE   Sitting and reading 2   Watching TV 1   Sitting, inactive in a public place (e.g. a theatre or a meeting) 0   As a passenger in a car for an hour without a break 1   Lying down to rest in the afternoon when circumstances permit 1   Sitting and talking to someone 0   Sitting quietly after a lunch without alcohol 1   In a car, while stopped for a few minutes in traffic 0   Total score 6         ASSESSMENT /PLAN:    Problem List Items Addressed This Visit          Other    Obstructive sleep apnea - Primary       - Continues to benefit from PAP therapy. Encouraged nightly use of PAP.   - Drowsy driving may still occur despite PAP use.   - F/u in 1 yr  "       Insomnia    - Suggested he try melatonin OTC. Start with 1-2 mg, and can increase to up to 10mg nightly. Take immediately prior to sleep onset.  - Call if insomnia persists with melatonin         Questions and concerns were sought and answered to the patient's stated verbal satisfaction.    The patient verbalizes understanding and agreement with the above stated treatment plan.   Items discussed include acute and/or chronic neurological, sleep, or other issues and their attendant differential diagnoses.  Potential for additional testing, treatment options, and prognosis also discussed.    ___single dx __*_multiple issues/ diagnoses  ___ low __* mod ___ high complexity of data  __*_low __mod ___ high risks     Medical Decision Making (MDM) used for CPT choice:  ___low  _*__moderate  ____high        FERNANDA Roca  [facility]  [phone]

## 2025-04-22 ENCOUNTER — TELEPHONE (OUTPATIENT)
Dept: NEUROLOGY | Facility: CLINIC | Age: 70
End: 2025-04-22
Payer: MEDICARE

## 2025-08-23 ENCOUNTER — PATIENT MESSAGE (OUTPATIENT)
Dept: RESEARCH | Facility: HOSPITAL | Age: 70
End: 2025-08-23
Payer: MEDICARE